# Patient Record
Sex: FEMALE | Race: WHITE | NOT HISPANIC OR LATINO | Employment: OTHER | URBAN - METROPOLITAN AREA
[De-identification: names, ages, dates, MRNs, and addresses within clinical notes are randomized per-mention and may not be internally consistent; named-entity substitution may affect disease eponyms.]

---

## 2017-06-29 ENCOUNTER — TRANSFERRED RECORDS (OUTPATIENT)
Dept: HEALTH INFORMATION MANAGEMENT | Facility: CLINIC | Age: 72
End: 2017-06-29

## 2017-06-30 ENCOUNTER — TRANSFERRED RECORDS (OUTPATIENT)
Dept: HEALTH INFORMATION MANAGEMENT | Facility: CLINIC | Age: 72
End: 2017-06-30

## 2017-07-05 ENCOUNTER — TRANSFERRED RECORDS (OUTPATIENT)
Dept: HEALTH INFORMATION MANAGEMENT | Facility: CLINIC | Age: 72
End: 2017-07-05

## 2017-07-18 ENCOUNTER — TRANSFERRED RECORDS (OUTPATIENT)
Dept: HEALTH INFORMATION MANAGEMENT | Facility: CLINIC | Age: 72
End: 2017-07-18

## 2017-08-17 ENCOUNTER — TRANSFERRED RECORDS (OUTPATIENT)
Dept: HEALTH INFORMATION MANAGEMENT | Facility: CLINIC | Age: 72
End: 2017-08-17

## 2017-08-22 ENCOUNTER — TRANSFERRED RECORDS (OUTPATIENT)
Dept: HEALTH INFORMATION MANAGEMENT | Facility: CLINIC | Age: 72
End: 2017-08-22

## 2017-08-30 ENCOUNTER — TRANSFERRED RECORDS (OUTPATIENT)
Dept: HEALTH INFORMATION MANAGEMENT | Facility: CLINIC | Age: 72
End: 2017-08-30

## 2017-09-06 ENCOUNTER — TRANSFERRED RECORDS (OUTPATIENT)
Dept: HEALTH INFORMATION MANAGEMENT | Facility: CLINIC | Age: 72
End: 2017-09-06

## 2017-09-08 ENCOUNTER — PRE VISIT (OUTPATIENT)
Dept: RADIATION ONCOLOGY | Facility: CLINIC | Age: 72
End: 2017-09-08

## 2017-09-08 NOTE — TELEPHONE ENCOUNTER
1.  Date/reason for appt: 9/14/2107, breast cancer    2.  Referring provider: Dr. Emily Neville    3.  Call to patient (Yes / No - short description): Yes, called patient to schedule consult    4.  Previous care at / records requested from: [facility] (images in pacs viewer and some records faxed with referral - sent request for additional records)

## 2017-09-11 NOTE — TELEPHONE ENCOUNTER
Received additional imaging reports from [facility] - sent to HIM for scanning. Received records from [facility] - sent to HIM for scanning. Requested additional records from [facility] (med onc consult, surgical consult, oncotype/genetic counseling if completed)

## 2017-09-13 ENCOUNTER — TELEPHONE (OUTPATIENT)
Dept: RADIATION ONCOLOGY | Facility: CLINIC | Age: 72
End: 2017-09-13

## 2017-09-13 ENCOUNTER — APPOINTMENT (OUTPATIENT)
Dept: RADIATION ONCOLOGY | Facility: CLINIC | Age: 72
End: 2017-09-13
Payer: MEDICARE

## 2017-09-13 PROCEDURE — 77263 THER RADIOLOGY TX PLNG CPLX: CPT | Performed by: RADIOLOGY

## 2017-09-13 NOTE — TELEPHONE ENCOUNTER
Spoke with patient about appointment time,location, and date with Dr Pino and patient verbalized understanding

## 2017-09-14 ENCOUNTER — APPOINTMENT (OUTPATIENT)
Dept: RADIATION ONCOLOGY | Facility: CLINIC | Age: 72
End: 2017-09-14
Payer: MEDICARE

## 2017-09-14 ENCOUNTER — OFFICE VISIT (OUTPATIENT)
Dept: RADIATION ONCOLOGY | Facility: CLINIC | Age: 72
End: 2017-09-14
Payer: MEDICARE

## 2017-09-14 VITALS
WEIGHT: 133 LBS | TEMPERATURE: 98 F | SYSTOLIC BLOOD PRESSURE: 154 MMHG | HEIGHT: 61 IN | HEART RATE: 57 BPM | DIASTOLIC BLOOD PRESSURE: 84 MMHG | RESPIRATION RATE: 16 BRPM | BODY MASS INDEX: 25.11 KG/M2 | OXYGEN SATURATION: 98 %

## 2017-09-14 DIAGNOSIS — C50.111 MALIGNANT NEOPLASM OF CENTRAL PORTION OF RIGHT BREAST IN FEMALE, ESTROGEN RECEPTOR POSITIVE (H): Primary | ICD-10-CM

## 2017-09-14 DIAGNOSIS — Z17.0 MALIGNANT NEOPLASM OF CENTRAL PORTION OF RIGHT BREAST IN FEMALE, ESTROGEN RECEPTOR POSITIVE (H): Primary | ICD-10-CM

## 2017-09-14 PROCEDURE — 77334 RADIATION TREATMENT AID(S): CPT | Performed by: RADIOLOGY

## 2017-09-14 PROCEDURE — 77290 THER RAD SIMULAJ FIELD CPLX: CPT | Performed by: RADIOLOGY

## 2017-09-14 PROCEDURE — 00000346 ZZHCL STATISTIC REVIEW OUTSIDE SLIDES TC 88321: Performed by: THORACIC SURGERY (CARDIOTHORACIC VASCULAR SURGERY)

## 2017-09-14 PROCEDURE — 99205 OFFICE O/P NEW HI 60 MIN: CPT | Mod: 25 | Performed by: RADIOLOGY

## 2017-09-14 RX ORDER — SIMVASTATIN 40 MG
40 TABLET ORAL
COMMUNITY
Start: 2017-06-27 | End: 2020-10-19

## 2017-09-14 RX ORDER — METOPROLOL SUCCINATE 25 MG/1
25 TABLET, EXTENDED RELEASE ORAL 2 TIMES DAILY
COMMUNITY
Start: 2017-06-27 | End: 2020-10-19

## 2017-09-14 RX ORDER — ACETAMINOPHEN 160 MG
TABLET,DISINTEGRATING ORAL
COMMUNITY
Start: 2011-11-28

## 2017-09-14 RX ORDER — LISINOPRIL 40 MG/1
40 TABLET ORAL
COMMUNITY
Start: 2017-06-27

## 2017-09-14 RX ORDER — HYDROCHLOROTHIAZIDE 50 MG/1
50 TABLET ORAL
COMMUNITY
Start: 2017-06-27

## 2017-09-14 RX ORDER — NITROGLYCERIN 0.4 MG/1
0.4 TABLET SUBLINGUAL
COMMUNITY
Start: 2017-06-27

## 2017-09-14 ASSESSMENT — ENCOUNTER SYMPTOMS
CONSTITUTIONAL NEGATIVE: 1
MYALGIAS: 1
BACK PAIN: 0
PSYCHIATRIC NEGATIVE: 1
NECK PAIN: 0
NEUROLOGICAL NEGATIVE: 1
GASTROINTESTINAL NEGATIVE: 1
FALLS: 0
CARDIOVASCULAR NEGATIVE: 1
EYES NEGATIVE: 1
RESPIRATORY NEGATIVE: 1

## 2017-09-14 ASSESSMENT — PAIN SCALES - GENERAL: PAINLEVEL: NO PAIN (0)

## 2017-09-14 NOTE — PROGRESS NOTES
HPI      Review of Systems   Constitutional: Negative.    HENT: Negative.    Eyes: Negative.    Respiratory: Negative.    Cardiovascular: Negative.    Gastrointestinal: Negative.    Genitourinary: Negative.    Musculoskeletal: Positive for myalgias. Negative for back pain, falls, joint pain and neck pain.        Patient reports having left calf pain/cramps when walking; has been evaluated, but etiology is unknown.   Skin: Negative.    Neurological: Negative.    Endo/Heme/Allergies: Negative.    Psychiatric/Behavioral: Negative.              INITIAL PATIENT ASSESSMENT    Diagnosis: right breast cancer     Prior radiation therapy: None    Prior chemotherapy: None    Prior hormonal therapy:Yes: Three months of HRT following THBSO at age 38    Pain Eval:  Denies    Psychosocial  Living arrangements: Lives at home in [place] with   Fall Risk: independent   referral needs: Not needed    Advanced Directive: No  Implantable Cardiac Device? No    Onset of menarche: 15  LMP: No LMP recorded. Patient is not currently having periods (Reason: Perimenopausal).  Onset of menopause: 38  Abnormal vaginal bleeding/discharge: No  Are you pregnant? No  Reproductive note: Patient reports two pregnancies and two live birth with first birth at age 21. Patient reports approximately 15 years of OCP use.    Nurse face-to-face time: Level 5:  over 15 min face to face time

## 2017-09-14 NOTE — PATIENT INSTRUCTIONS
What to expect at your Simulation visit:    You will meet with a Radiation Therapist and other team members who will be doing a planning session called a  simulation  with you. This process will determine your daily treatment.    ~ You will lie on a flat table and have a treatment planning CT scan.  It is important during the scan to hold very still and breathe normally.    ~ Your therapist may construct a body mold to help you hold still for your treatments.    ~ If you are having treatment to the head or neck area you will be fitted with a plastic mesh mask that fits very snugly over your face and neck.     ~ Your therapist will be taking some digital photos that will go in your treatment chart.      ~Your therapist will make marks on your skin and take measurements. Your therapist may ask you about making small tattoos (a permanent small dot) over these marks.  These marks are used to position you daily for your radiation therapy treatments. Please do not wash off any marks until all of your radiation therapy treatments are complete unless you are instructed to do so by your therapist.    ~ Once the simulation is completed it can take from 3 to 10 business days before you start radiation therapy treatments.    ~ You may meet with a nurse who will go over management of treatment side effects and self care during your treatments. The nurse will help to plan care with other departments and physicians if needed.    Please contact [facility] RN with questions or concerns following today's appointment: [phone].    Thank you!    Lana Graf, RN  RN Care Coordinator, Radiation Therapy  [facility]

## 2017-09-14 NOTE — PROGRESS NOTES
Dear Colleagues,    Today I had the pleasure of seeing this patient in consultation for her recent diagnosis of right (subareolar) breast cancer.     IDENTIFICATION: Ms. Hunter is a 72-year-old postmenopausal woman with a recent diagnosis of right breast bB3A3B4 cancer s/p lumpectomy plus SLNBx revealing wG2R3J5, ER+/CA+/H2N- referred for discussion regarding adjuvant radiation therapy.     HISTORY OF PRESENT ILLNESS: Ms. Hunter is a 71yo woman who was in her usual state of health this past year.   She previously had been receiving MMGs annually. She was seen by her PCP in July of this year who noted right nipple inversion without a discrete mass.  She specifically denies having any new breast masses or other skin changes during this time.      On 7/5/17, diagnostic MMG and ultrasound showed a partially obscured lobulated mass in the central mid right breast. Remainder of the breasts tissue was unremarkable. Ultrasound of the right breast also showed a subtle indistinct lobulated hypoechoic subareolar mass measuring 2.5 cm in greatest dimension correlating to the mammographic findings. No enlarged right axillary lymph nodes were noted.    On 7/18/17, ultrasound guided right breast biopsy of the subareolar region confirmed G1 IDCA w/ extensive mucin production.    She then saw Dr. Erick Villavicencio (Rad Onc) and Dr. Allen Ashby (Surg Onc) in consultation. On 8/22/17, she was taken to the OR by Dr. Ashby for lumpectomy plus SLNBx.  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  No DCIS or LCIS identified.  This gave her a pathologic stage of gN1N8W7, ER+/CA+/H2N-.    She was recently seen by Med Onc on 9/6/17 and no chemo was recommended.  She declined endocrine therapy.     Currently she has no complaints.  She specifically denies any other new masses, skin changes, shortness of breath, chest or bony pain, or new  neurologic symptoms.  She is being seen here today for consideration of postoperative radiotherapy.     REVIEW OF SYSTEMS: As per HPI, a 14-point review of systems is otherwise negative.     PAST RADIATION THERAPY:  Denies.    PAST CTD/PACEMAKER: Denies    Past Medical History:   Diagnosis Date     Coronary atherosclerosis      Diverticulosis of large intestine 2010     Essential hypertension      Internal hemorrhoids      Ischemic heart disease      Malignant neoplasm of right breast (H) 2017     Osteoporosis      Unspecified cataract        Past Surgical History:   Procedure Laterality Date     APPENDECTOMY       CATARACT IOL, RT/LT Right 2014     CATARACT IOL, RT/LT Left 10/08/2014     CHOLECYSTECTOMY       HC BLEPHAROPLASTY UPPER LID W EXCESS SKIN       HYSTERECTOMY  1983     LASER SURGERY OF EYE Bilateral 2017     lumbar laminectomy  2003     percutaneous transluminal coronary angioplasty  2007     TONSILLECTOMY & ADENOIDECTOMY       US BREAST BIOPSY RT Right        Family History   Problem Relation Age of Onset     Leukemia Mother 83     Lung Cancer Mother 45     Lupus Father 68     Breast Cancer Sister 71     Same pathology as patient's, but left breast     Kidney Cancer Sister 55     Brain Cancer Maternal Aunt        Social History   Substance Use Topics     Smoking status: Former Smoker     Packs/day: 1.00     Years: 40.00     Smokeless tobacco: Never Used     Alcohol use No     Lives in [place] but also lives in [place] October through May. Retired .     BREAST RISK FACTORS:  Postmenopausal. Menarche age 15, , 1st child at age 21, OCP x 15years , AMARI  BSO at age 38 for uterine fibroids. Menopause age 38. HRT for 3 months. Sister with mucinous left breast cancer dx 70yo. Very active, exercises regularly.  No family or personal history of ovarian cancer.     PHYSICAL EXAMINATION:  /84 (BP Location: Left arm, Patient Position: Sitting, Cuff Size: Adult  "Regular)  Pulse 57  Temp 98  F (36.7  C) (Oral)  Resp 16  Ht 5' 1\"  Wt 133 lb  SpO2 98%  BMI 25.13 kg/m2  GENERAL                         Well-appearing elderly woman in no acute distress.  HEENT                              Normocephalic, atraumatic.  Sclerae anicteric. Wears glasses.  CARDIOVASCULAR       Regular rate and rhythm.  No murmurs, rubs, or gallops.  LUNGS                              Clear to auscultation bilaterally.  BREASTS                           Breasts are examined in the supine and upright position.  The breasts are asymmetric with the left breast being slightly larger. Within the right upper outer breast/right axilla and upper breast at the 12 oclock position there are two ~3cm well healed incisions.  Firmness of right breast around the incision not suspicious.  There is no other erythema, ulceration or suspicious nodularity within the right breast. The left breast is unremarkable.  There is no erythema, retraction, desquamation or discharge appreciated within the right or left nipple areolar complex.    LYMPH NODES                 No cervical, supraclavicular, infraclavicular, or axillary lymphadenopathy appreciated bilaterally.  ABDOMEN                        Soft.  No hepatosplenomegaly.  Positive bowel sounds.  EXTREMITIES                    No clubbing or cyanosis.    NEUROLOGICAL               Cranial nerves II-XII intact.  5/5 strength in bilateral upper and lower proximal and distal extremities. Sensation intact in all areas tested.  MUSCULOSKELETAL         Good ROM. Slighly restricted with right arm.  No spinal tenderness.  SKIN                                  Warm and well perfused.  Signs of extensive sun exposure.  PSYCHIATRIC                    Alert and oriented x 3     IMPRESSION/PLAN:  Ms. Hunter is a 72-year-old postmenopausal woman with a recent diagnosis of right breast cancer s/p lumpectomy plus SLNBx revealing invasive mucinous carcinoma, xY9H2P3, ER+/IL+/H2N- " referred for discussion regarding adjuvant radiation therapy. No DCIS/LCIS was identified, clear margins. She has a favorable histology (mucinous), which is rare and generally associated with a better prognosis, longer disease-free interval and lower incidence of axillary node metastasis.   I recommend adjuvant XRT to improve local control and overall survival.     Treatment is based on multiple randomized prospective data which show decreased risk of local recurrence by 50% with the addition of XRT to BCS and the EBCTCG metaanalysis published in Lancet 2011 which shows that for every 4 recurrences avoided by year 10 one breast cancer death is avoided by year 15.  We will give her treatment in a hypofractionated regimen which has been shown to be not inferior to standard fractionation long term (Audra CHING et al 2010).      The risks, benefits, treatment rationale and regimen of radiation therapy to the right breast were discussed in great detail today with the patient.  Risks include but are not limited to skin erythema, desquamation, hyper pigmentation, fibrosis, pneumonitis, and secondary malignancy. The patient consented to therapy and had a CT simulation completed in our department today.  XRT will start within the next 1-2 weeks.  Additional problem list to be addressed in the following manner:  1) Systemic Treatment: Postmenopausal Woman, mG3J9J0, ER+/WV+/H2N-.  Med Onc appt was 9/6/17.  No chemo recommended.  Pt currently declining endocrine therapy.  However we discussed the potential benefits of it today and she will revisit the conversation with Med Onc after XRT.    There was ample time for questions and all were answered to the patients satisfaction.  Thank you for allowing me to participate in the care of this patient.  If you have any questions please do not hesitate to contact me at my office.     Sincerely,  Sid Pino MD     Cc:   MD Allen Rob MD Michael Haley, MD Melissa  MD Jamin

## 2017-09-15 ENCOUNTER — TELEPHONE (OUTPATIENT)
Dept: RADIATION ONCOLOGY | Facility: CLINIC | Age: 72
End: 2017-09-15

## 2017-09-20 ENCOUNTER — APPOINTMENT (OUTPATIENT)
Dept: RADIATION ONCOLOGY | Facility: CLINIC | Age: 72
End: 2017-09-20
Payer: MEDICARE

## 2017-09-20 PROCEDURE — 77334 RADIATION TREATMENT AID(S): CPT | Performed by: RADIOLOGY

## 2017-09-20 PROCEDURE — 77307 TELETHX ISODOSE PLAN CPLX: CPT | Performed by: RADIOLOGY

## 2017-09-25 ENCOUNTER — APPOINTMENT (OUTPATIENT)
Dept: RADIATION ONCOLOGY | Facility: CLINIC | Age: 72
End: 2017-09-25
Payer: MEDICARE

## 2017-09-25 PROCEDURE — 77280 THER RAD SIMULAJ FIELD SMPL: CPT | Performed by: RADIOLOGY

## 2017-09-26 ENCOUNTER — APPOINTMENT (OUTPATIENT)
Dept: RADIATION ONCOLOGY | Facility: CLINIC | Age: 72
End: 2017-09-26
Payer: MEDICARE

## 2017-09-26 PROCEDURE — 77412 RADIATION TX DELIVERY LVL 3: CPT | Performed by: RADIOLOGY

## 2017-09-27 ENCOUNTER — APPOINTMENT (OUTPATIENT)
Dept: RADIATION ONCOLOGY | Facility: CLINIC | Age: 72
End: 2017-09-27
Payer: MEDICARE

## 2017-09-27 ENCOUNTER — OFFICE VISIT (OUTPATIENT)
Dept: RADIATION ONCOLOGY | Facility: CLINIC | Age: 72
End: 2017-09-27
Payer: MEDICARE

## 2017-09-27 VITALS — WEIGHT: 133 LBS | BODY MASS INDEX: 25.13 KG/M2

## 2017-09-27 DIAGNOSIS — Z17.0 MALIGNANT NEOPLASM OF CENTRAL PORTION OF RIGHT BREAST IN FEMALE, ESTROGEN RECEPTOR POSITIVE (H): Primary | ICD-10-CM

## 2017-09-27 DIAGNOSIS — C50.111 MALIGNANT NEOPLASM OF CENTRAL PORTION OF RIGHT BREAST IN FEMALE, ESTROGEN RECEPTOR POSITIVE (H): Primary | ICD-10-CM

## 2017-09-27 PROCEDURE — 99207 ZZC DROP WITH A PROCEDURE: CPT | Performed by: RADIOLOGY

## 2017-09-27 PROCEDURE — 77412 RADIATION TX DELIVERY LVL 3: CPT | Performed by: RADIOLOGY

## 2017-09-27 ASSESSMENT — PAIN SCALES - GENERAL: PAINLEVEL: NO PAIN (0)

## 2017-09-27 NOTE — MR AVS SNAPSHOT
After Visit Summary   9/27/2017    Shantelle Hunter    MRN: [id number]           Patient Information     Date Of Birth          1945        Visit Information        Provider Department      9/27/2017 10:00 AM Mai Pino MD [facility]        Today's Diagnoses     Malignant neoplasm of central portion of right breast in female, estrogen receptor positive (H)    -  1      Care Instructions    Please contact [facility] RN with questions or concerns following today's appointment: [phone].    Thank you!    Lana Graf, RN  RN Care Coordinator, Radiation Therapy  [facility]            Follow-ups after your visit        Your next 10 appointments already scheduled     Sep 28, 2017 12:15 PM CDT   TREATMENT with RADIATION THERAPIST   [facility]    [address] [facility] [address]   [phone]            Sep 29, 2017 12:15 PM CDT   TREATMENT with RADIATION THERAPIST   [facility]    [address] [facility] [address]   [phone]            Oct 02, 2017  8:30 AM CDT   TREATMENT with RADIATION THERAPIST   [facility]    [address] [facility] [address]   [phone]            Oct 03, 2017 12:15 PM CDT   TREATMENT with RADIATION THERAPIST   [facility] ([facility]    [address] [facility] [address]   [phone]            Oct 04, 2017  9:30 AM CDT   TREATMENT with RADIATION THERAPIST   [facility] ([facility])    [address] [facility] [address]   [phone]            Oct 04, 2017  9:45 AM CDT   on treatment visit with Mai Pino MD   [facility] ([facility])    [address] [facility] [address]    [phone]            Oct 05, 2017  9:30 AM CDT   TREATMENT with RADIATION THERAPIST   [facility]    [address] [facility] [address]   [phone]            Oct 06, 2017  9:30 AM CDT   TREATMENT with RADIATION THERAPIST   [facility]    [address] [facility] [address]   [phone]            Oct 09, 2017  8:45 AM CDT   TREATMENT with RADIATION THERAPIST   [facility]    [address] [facility] [address] [phone]            Oct 10, 2017  9:30 AM CDT   TREATMENT with RADIATION THERAPIST   [facility] ([facility])    [address] [facility] [address]   [phone]              Who to contact     If you have questions or need follow up information about today's clinic visit or your schedule please contact [facility] directly at [phone].  Normal or non-critical lab and imaging results will be communicated to you by MyChart, letter or phone within 4 business days after the clinic has received the results. If you do not hear from us within 7 days, please contact the clinic through [employer] or phone. If you have a critical or abnormal lab result, we will notify you by phone as soon as possible.  Submit refill requests through [employer] or call your pharmacy and they will forward the refill request to us. Please allow 3 business days for your refill to be completed.          Additional Information About Your Visit        [employer] Information     [employer] is an electronic gateway that provides easy, online access to your medical records. With [employer], you can request a clinic appointment, read your test results, renew a prescription or communicate with your care team.     To sign up for [employer] visit the website at [email or web address]   You will be  asked to enter the access code listed below, as well as some personal information. Please follow the directions to create your username and password.     Your access code is: [email or web address]  Expires: 2017 12:37 PM     Your access code will  in 90 days. If you need help or a new code, please contact your [facility] or call [phone] for assistance.        Care EveryWhere ID     This is your Care EveryWhere ID. This could be used by other organizations to access your [facility] medical records  [id number]        Your Vitals Were     BMI (Body Mass Index)                   25.13 kg/m2            Blood Pressure from Last 3 Encounters:   17 154/84    Weight from Last 3 Encounters:   17 133 lb   17 133 lb              Today, you had the following     No orders found for display       Primary Care Provider Office Phone # Fax #    Shari Ash [phone]       [facility] [address] [place] [address]        Equal Access to Services     [facility]: Hadii aad ku hadasho Soomaali, waaxda luqadaha, qaybta kaalmada adeegyada, waxay jo ann haywilfrid johns . So [facility] [phone].    ATENCIÓN: Si habla español, tiene a dykes disposición servicios gratuitos de asistencia lingüística. Jacob al [phone].    We comply with applicable federal civil rights laws and [place] laws. We do not discriminate on the basis of race, color, national origin, age, disability sex, sexual orientation or gender identity.            Thank you!     Thank you for choosing [facility]  for your care. Our goal is always to provide you with excellent care. Hearing back from our patients is one way we can continue to improve our services. Please take a few minutes to complete the written survey that you may receive in the mail after your visit with us. Thank you!             Your Updated Medication List -  Protect others around you: Learn how to safely use, store and throw away your medicines at www.disposemymeds.org.          This list is accurate as of: 9/27/17 12:37 PM.  Always use your most recent med list.                   Brand Name Dispense Instructions for use Diagnosis    aspirin  MG EC tablet           hydrochlorothiazide 50 MG tablet    HYDRODIURIL     Take 50 mg by mouth        lisinopril 40 MG tablet    PRINIVIL/ZESTRIL     Take 40 mg by mouth        metoprolol 25 MG 24 hr tablet    TOPROL-XL     Take 25 mg by mouth        nitroGLYcerin 0.4 MG sublingual tablet    NITROSTAT     Place 0.4 mg under the tongue        simvastatin 40 MG tablet    ZOCOR     Take 40 mg by mouth        vitamin D3 2000 UNITS Caps

## 2017-09-27 NOTE — PATIENT INSTRUCTIONS
Please contact [facility] RN with questions or concerns following today's appointment: [phone].    Thank you!    Lana Graf, RN  RN Care Coordinator, Radiation Therapy  [facility]

## 2017-09-27 NOTE — PROGRESS NOTES
[facility] PHYSICIANS  SPECIALIZING IN BREAKTHROUGHS  Radiation Oncology    On Treatment Visit Note      Shantelle Hunter      Date: 2017   MRN: [id number]   : 1945  Diagnosis: Right breast cancer      Reason for Visit:  On Radiation Treatment Visit     Treatment Summary to Date  Treatment Site: Right breast Current Dose: 532/4256 cGy Fractions:       Chemotherapy  Chemo concurrent with radx?: No    Subjective:     Doing well with no complaints.  Early in treatment.    Nursing ROS:   Nutrition Alteration  Diet Type: Patient's Preference  Skin  Skin Reaction: 0 - No changes  Skin Note: Aquaphor samples given, and aloe vera gel recommended     Cardiovascular  Respiratory effort: 1 - Normal - without distress     Psychosocial  Pyschosocial Note: Patient reports energy level is good (baseline) at present  Pain Assessment  0-10 Pain Scale: 0      Objective:   Wt 133 lb  BMI 25.13 kg/m2  NAD  A&Ox3  Skin clear no desquamation.  Operative site is CDI.    Labs:  CBC RESULTS: No results for input(s): WBC, RBC, HGB, HCT, MCV, MCH, MCHC, RDW, PLT in the last 66017 hours.  ELECTROLYTES:  No results for input(s): NA, POTASSIUM, CHLORIDE, JEFFREY, CO2, BUN, CR, GLC in the last 21205 hours.    Assessment:    Tolerating radiation therapy well.  All questions and concerns addressed.    Plan:   1. Continue current therapy.    2. Cont skin care.      Mosaiq chart and setup information reviewed  Ports checked        Educational Topic Discussed  Education Instructions: Radiation therapy side effect education completed      Francie Pino MD

## 2017-09-28 ENCOUNTER — APPOINTMENT (OUTPATIENT)
Dept: RADIATION ONCOLOGY | Facility: CLINIC | Age: 72
End: 2017-09-28
Payer: MEDICARE

## 2017-09-28 PROCEDURE — 77412 RADIATION TX DELIVERY LVL 3: CPT | Performed by: RADIOLOGY

## 2017-09-29 ENCOUNTER — APPOINTMENT (OUTPATIENT)
Dept: RADIATION ONCOLOGY | Facility: CLINIC | Age: 72
End: 2017-09-29
Payer: MEDICARE

## 2017-09-29 PROCEDURE — 77412 RADIATION TX DELIVERY LVL 3: CPT | Performed by: RADIOLOGY

## 2017-10-02 ENCOUNTER — APPOINTMENT (OUTPATIENT)
Dept: RADIATION ONCOLOGY | Facility: CLINIC | Age: 72
End: 2017-10-02
Payer: MEDICARE

## 2017-10-02 PROCEDURE — 77427 RADIATION TX MANAGEMENT X5: CPT | Performed by: RADIOLOGY

## 2017-10-02 PROCEDURE — 77412 RADIATION TX DELIVERY LVL 3: CPT | Performed by: RADIOLOGY

## 2017-10-02 PROCEDURE — 77336 RADIATION PHYSICS CONSULT: CPT | Performed by: RADIOLOGY

## 2017-10-02 PROCEDURE — 77417 THER RADIOLOGY PORT IMAGE(S): CPT | Performed by: RADIOLOGY

## 2017-10-03 ENCOUNTER — APPOINTMENT (OUTPATIENT)
Dept: RADIATION ONCOLOGY | Facility: CLINIC | Age: 72
End: 2017-10-03
Payer: MEDICARE

## 2017-10-03 PROCEDURE — 77412 RADIATION TX DELIVERY LVL 3: CPT | Performed by: RADIOLOGY

## 2017-10-04 ENCOUNTER — OFFICE VISIT (OUTPATIENT)
Dept: RADIATION ONCOLOGY | Facility: CLINIC | Age: 72
End: 2017-10-04
Payer: MEDICARE

## 2017-10-04 ENCOUNTER — DOCUMENTATION ONLY (OUTPATIENT)
Dept: SPIRITUAL SERVICES | Facility: CLINIC | Age: 72
End: 2017-10-04

## 2017-10-04 ENCOUNTER — APPOINTMENT (OUTPATIENT)
Dept: RADIATION ONCOLOGY | Facility: CLINIC | Age: 72
End: 2017-10-04
Payer: MEDICARE

## 2017-10-04 VITALS — WEIGHT: 134 LBS | BODY MASS INDEX: 25.32 KG/M2

## 2017-10-04 DIAGNOSIS — Z71.81 SPIRITUAL OR RELIGIOUS COUNSELING: Primary | ICD-10-CM

## 2017-10-04 DIAGNOSIS — C50.111 MALIGNANT NEOPLASM OF CENTRAL PORTION OF RIGHT BREAST IN FEMALE, ESTROGEN RECEPTOR POSITIVE (H): Primary | ICD-10-CM

## 2017-10-04 DIAGNOSIS — Z17.0 MALIGNANT NEOPLASM OF CENTRAL PORTION OF RIGHT BREAST IN FEMALE, ESTROGEN RECEPTOR POSITIVE (H): Primary | ICD-10-CM

## 2017-10-04 PROCEDURE — 99207 ZZC DROP WITH A PROCEDURE: CPT | Performed by: RADIOLOGY

## 2017-10-04 PROCEDURE — 77412 RADIATION TX DELIVERY LVL 3: CPT | Performed by: RADIOLOGY

## 2017-10-04 ASSESSMENT — PAIN SCALES - GENERAL: PAINLEVEL: NO PAIN (0)

## 2017-10-04 NOTE — PROGRESS NOTES
[facility] PHYSICIANS  SPECIALIZING IN BREAKTHROUGHS  Radiation Oncology    On Treatment Visit Note      Shantelle Hunter      Date: 10/4/2017   MRN: [id number]   : 1945  Diagnosis: Right breast cancer      Reason for Visit:  On Radiation Treatment Visit     Treatment Summary to Date  Treatment Site: Right breast Current Dose: 1862/4256 cGy Fractions:       Chemotherapy  Chemo concurrent with radx?: No    Subjective:     Doing well with no complaints.    Nursing ROS:   Nutrition Alteration  Diet Type: Patient's Preference  Skin  Skin Reaction: 0 - No changes  Skin Note: patient reports using 100% aloa      Cardiovascular  Respiratory effort: 1 - Normal - without distress      Psychosocial  Pyschosocial Note: Patient reports energy level is good (baseline) at present  Pain Assessment  0-10 Pain Scale: 0    Objective:   Wt 134 lb  BMI 25.32 kg/m2  A&O x 3  NAD  Minimal erythema within treatment field.  No desquamation.    Labs:  CBC RESULTS: No results for input(s): WBC, RBC, HGB, HCT, MCV, MCH, MCHC, RDW, PLT in the last 93240 hours.  ELECTROLYTES:  No results for input(s): NA, POTASSIUM, CHLORIDE, JEFFREY, CO2, BUN, CR, GLC in the last 54897 hours.    Assessment:    Tolerating radiation therapy well.  All questions and concerns addressed.    Plan:   1. Continue current therapy.    2. Continue skin care.      Mosaiq chart and setup information reviewed  Ports checked    Medication Review  Med list reviewed with patient?: Yes       Francie Pino MD

## 2017-10-04 NOTE — PROGRESS NOTES
"SPIRITUAL HEALTH SERVICES  SPIRITUAL ASSESSMENT Progress Note  [facility]     introduced himself to Shantelle Hunter and informed her of his availability.  She remarked that she was \"covered in that area (spiritually)\".    Chaz Pruett M.Div., [facility]  Staff   Office tel: [phone]    "

## 2017-10-04 NOTE — MR AVS SNAPSHOT
After Visit Summary   10/4/2017    Shantelle Hunter    MRN: [id number]           Patient Information     Date Of Birth          1945        Visit Information        Provider Department      10/4/2017 9:45 AM Mai Pino MD [facility]        Today's Diagnoses     Malignant neoplasm of central portion of right breast in female, estrogen receptor positive (H)    -  1      Care Instructions    Please contact [facility] RN with questions or concerns following today's appointment: [phone].    Thank you!            Follow-ups after your visit        Your next 10 appointments already scheduled     Oct 05, 2017  9:30 AM CDT   TREATMENT with RADIATION THERAPIST   [facility] ([facility])    [address] [facility] [address]   [phone]            Oct 06, 2017  9:30 AM CDT   TREATMENT with RADIATION THERAPIST   [facility] ([facility])    [address] [facility] [address]   [phone]            Oct 09, 2017  8:45 AM CDT   TREATMENT with RADIATION THERAPIST   [facility] ([facility])    [address] [facility] [address]   [phone]            Oct 10, 2017  9:30 AM CDT   TREATMENT with RADIATION THERAPIST   [facility] ([facility])    [address] [facility] [address]   [phone]            Oct 11, 2017  9:30 AM CDT   TREATMENT with RADIATION THERAPIST   [facility] ([facility])    [address] [facility] [address]   [phone]            Oct 11, 2017  9:45 AM CDT   on treatment visit with Mai Pino MD   [facility] ([facility])    [address] [facility] [address]   [phone]            Oct 12, 2017  9:30 AM CDT   TREATMENT with RADIATION THERAPIST   M  [facility] ([facility])    [address] [facility] [address]   [phone]            Oct 13, 2017  9:30 AM CDT   TREATMENT with RADIATION THERAPIST   [facility] ([facility])    [address] [facility] [address]   [phone]            Oct 16, 2017  9:30 AM CDT   TREATMENT with RADIATION THERAPIST   [facility] ([facility])    [address] [facility] [address]   [phone]            Oct 17, 2017  9:30 AM CDT   TREATMENT with RADIATION THERAPIST   [facility] ([facility])    [address] [facility] [address] [phone]              Who to contact     If you have questions or need follow up information about today's clinic visit or your schedule please contact [facility] directly at [phone].  Normal or non-critical lab and imaging results will be communicated to you by MyChart, letter or phone within 4 business days after the clinic has received the results. If you do not hear from us within 7 days, please contact the clinic through Usariumhart or phone. If you have a critical or abnormal lab result, we will notify you by phone as soon as possible.  Submit refill requests through MobileMD or call your pharmacy and they will forward the refill request to us. Please allow 3 business days for your refill to be completed.          Additional Information About Your Visit        MobileMD Information     MobileMD is an electronic gateway that provides easy, online access to your medical records. With MobileMD, you can request a clinic appointment, read your test results, renew a prescription or communicate with your care team.     To sign up for MobileMD visit the website at [email or web address]   You will be asked to enter the access code listed below, as well as some personal information. Please follow  the directions to create your username and password.     Your access code is: [email or web address]  Expires: 2017 12:37 PM     Your access code will  in 90 days. If you need help or a new code, please contact your [facility] or call [phone] for assistance.        Care EveryWhere ID     This is your Care EveryWhere ID. This could be used by other organizations to access your [place] medical records  [id number]        Your Vitals Were     BMI (Body Mass Index)                   25.32 kg/m2            Blood Pressure from Last 3 Encounters:   17 154/84    Weight from Last 3 Encounters:   10/04/17 134 lb   17 133 lb   17 133 lb              Today, you had the following     No orders found for display       Primary Care Provider Office Phone # Fax #    Shari Ash [phone]       [facility] [address] [place] [address]        Equal Access to Services     BRIAN [facility] Center[facility]BER : Hadii aad ku hadasho Soomaali, waaxda luqadaha, qaybta kaalmada adeegyada, waxay idiin hayaan reji johns . So [facility] [phone].    ATENCIÓN: Si habla español, tiene a dykes disposición servicios gratuitos de asistencia lingüística. Llame al [phone].    We comply with applicable federal civil rights laws and [place] laws. We do not discriminate on the basis of race, color, national origin, age, disability, sex, sexual orientation, or gender identity.            Thank you!     Thank you for choosing [facility]  for your care. Our goal is always to provide you with excellent care. Hearing back from our patients is one way we can continue to improve our services. Please take a few minutes to complete the written survey that you may receive in the mail after your visit with us. Thank you!             Your Updated Medication List - Protect others around you: Learn how to safely use, store and throw away your  medicines at www.disposemymeds.org.          This list is accurate as of: 10/4/17  2:05 PM.  Always use your most recent med list.                   Brand Name Dispense Instructions for use Diagnosis    aspirin  MG EC tablet           hydrochlorothiazide 50 MG tablet    HYDRODIURIL     Take 50 mg by mouth        lisinopril 40 MG tablet    PRINIVIL/ZESTRIL     Take 40 mg by mouth        metoprolol 25 MG 24 hr tablet    TOPROL-XL     Take 25 mg by mouth        nitroGLYcerin 0.4 MG sublingual tablet    NITROSTAT     Place 0.4 mg under the tongue        simvastatin 40 MG tablet    ZOCOR     Take 40 mg by mouth        vitamin D3 2000 UNITS Caps

## 2017-10-04 NOTE — PATIENT INSTRUCTIONS
Please contact [facility] RN with questions or concerns following today's appointment: [phone].    Thank you!

## 2017-10-05 ENCOUNTER — APPOINTMENT (OUTPATIENT)
Dept: RADIATION ONCOLOGY | Facility: CLINIC | Age: 72
End: 2017-10-05
Payer: MEDICARE

## 2017-10-05 LAB — COPATH REPORT: NORMAL

## 2017-10-05 PROCEDURE — 77412 RADIATION TX DELIVERY LVL 3: CPT | Performed by: RADIOLOGY

## 2017-10-06 ENCOUNTER — APPOINTMENT (OUTPATIENT)
Dept: RADIATION ONCOLOGY | Facility: CLINIC | Age: 72
End: 2017-10-06
Payer: MEDICARE

## 2017-10-06 PROCEDURE — 77412 RADIATION TX DELIVERY LVL 3: CPT | Performed by: RADIOLOGY

## 2017-10-09 ENCOUNTER — APPOINTMENT (OUTPATIENT)
Dept: RADIATION ONCOLOGY | Facility: CLINIC | Age: 72
End: 2017-10-09
Payer: MEDICARE

## 2017-10-09 PROCEDURE — 77412 RADIATION TX DELIVERY LVL 3: CPT | Performed by: RADIOLOGY

## 2017-10-09 PROCEDURE — 77417 THER RADIOLOGY PORT IMAGE(S): CPT | Performed by: RADIOLOGY

## 2017-10-09 PROCEDURE — 77427 RADIATION TX MANAGEMENT X5: CPT | Performed by: RADIOLOGY

## 2017-10-09 PROCEDURE — 77336 RADIATION PHYSICS CONSULT: CPT | Performed by: RADIOLOGY

## 2017-10-10 ENCOUNTER — APPOINTMENT (OUTPATIENT)
Dept: RADIATION ONCOLOGY | Facility: CLINIC | Age: 72
End: 2017-10-10
Payer: MEDICARE

## 2017-10-10 PROCEDURE — 77412 RADIATION TX DELIVERY LVL 3: CPT | Performed by: RADIOLOGY

## 2017-10-11 ENCOUNTER — APPOINTMENT (OUTPATIENT)
Dept: RADIATION ONCOLOGY | Facility: CLINIC | Age: 72
End: 2017-10-11
Payer: MEDICARE

## 2017-10-11 ENCOUNTER — OFFICE VISIT (OUTPATIENT)
Dept: RADIATION ONCOLOGY | Facility: CLINIC | Age: 72
End: 2017-10-11
Payer: MEDICARE

## 2017-10-11 VITALS — BODY MASS INDEX: 25.11 KG/M2 | WEIGHT: 132.9 LBS

## 2017-10-11 DIAGNOSIS — C50.111 MALIGNANT NEOPLASM OF CENTRAL PORTION OF RIGHT BREAST IN FEMALE, ESTROGEN RECEPTOR POSITIVE (H): Primary | ICD-10-CM

## 2017-10-11 DIAGNOSIS — Z17.0 MALIGNANT NEOPLASM OF CENTRAL PORTION OF RIGHT BREAST IN FEMALE, ESTROGEN RECEPTOR POSITIVE (H): Primary | ICD-10-CM

## 2017-10-11 PROCEDURE — 77412 RADIATION TX DELIVERY LVL 3: CPT | Performed by: RADIOLOGY

## 2017-10-11 PROCEDURE — 99207 ZZC DROP WITH A PROCEDURE: CPT | Performed by: RADIOLOGY

## 2017-10-11 ASSESSMENT — PAIN SCALES - GENERAL: PAINLEVEL: NO PAIN (0)

## 2017-10-11 NOTE — MR AVS SNAPSHOT
After Visit Summary   10/11/2017    Shantelle Hunter    MRN: [id number]           Patient Information     Date Of Birth          1945        Visit Information        Provider Department      10/11/2017 9:15 AM Mai Pino MD [facility]        Today's Diagnoses     Malignant neoplasm of central portion of right breast in female, estrogen receptor positive (H)    -  1      Care Instructions    [facility] RN with questions or concerns following today's appointment: [phone].    Thank you!            Follow-ups after your visit        Your next 10 appointments already scheduled     Oct 12, 2017  9:30 AM CDT   TREATMENT with RADIATION THERAPIST   [facility] ([facility])    [address] [facility] [address]   [phone]            Oct 13, 2017  9:30 AM CDT   TREATMENT with RADIATION THERAPIST   [facility] ([facility])    [address] [facility] [address]   [phone]            Oct 16, 2017  7:45 AM CDT   TREATMENT with RADIATION THERAPIST   [facility] ([facility])    [address] [facility] [address]   [phone]            Oct 17, 2017  9:30 AM CDT   TREATMENT with RADIATION THERAPIST   [facility] ([facility])    [address] [facility] [address]   [phone]            Oct 17, 2017  9:45 AM CDT   on treatment visit with Mai Pino MD   [facility])    [address] [facility] [address]   [phone]            Nov 14, 2017  2:30 PM CST   Return Visit with Mai Pino MD   [facility])    [address] [facility] [address] [phone]              Who to contact     If you have questions or need follow up  information about today's clinic visit or your schedule please contact [facility] directly at [phone].  Normal or non-critical lab and imaging results will be communicated to you by SCS Grouphart, letter or phone within 4 business days after the clinic has received the results. If you do not hear from us within 7 days, please contact the clinic through SCS Grouphart or phone. If you have a critical or abnormal lab result, we will notify you by phone as soon as possible.  Submit refill requests through LocusLabs or call your pharmacy and they will forward the refill request to us. Please allow 3 business days for your refill to be completed.          Additional Information About Your Visit        SCS GroupharEtelos Information     LocusLabs is an electronic gateway that provides easy, online access to your medical records. With LocusLabs, you can request a clinic appointment, read your test results, renew a prescription or communicate with your care team.     To sign up for LocusLabs visit the website at [email or web address]   You will be asked to enter the access code listed below, as well as some personal information. Please follow the directions to create your username and password.     Your access code is: [email or web address]  Expires: 2017 12:37 PM     Your access code will  in 90 days. If you need help or a new code, please contact your [facility] or call [phone] for assistance.        Care EveryWhere ID     This is your Care EveryWhere ID. This could be used by other organizations to access your [place] medical records  [id number]        Your Vitals Were     BMI (Body Mass Index)                   25.11 kg/m2            Blood Pressure from Last 3 Encounters:   17 154/84    Weight from Last 3 Encounters:   10/11/17 132 lb 14.4 oz   10/04/17 134 lb   17 133 lb              Today, you had the following     No orders found for display       Primary Care  Provider Office Phone # Fax #    Shari Ash [phone]       [facility] [address] [place] [address]        Equal Access to Services     JEFRY ROBLES : Haddebi max lindsay kaylano Soomaali, waaxda luqadaha, qaybta kaalmada adeegyada, petrona mascorron reji goldman laMarywilfrid basurto. So [facility] [phone].    ATENCIÓN: Si habla español, tiene a dykes disposición servicios gratuitos de asistencia lingüística. Llame al [phone].    We comply with applicable federal civil rights laws and [place] laws. We do not discriminate on the basis of race, color, national origin, age, disability, sex, sexual orientation, or gender identity.            Thank you!     Thank you for choosing [facility]  for your care. Our goal is always to provide you with excellent care. Hearing back from our patients is one way we can continue to improve our services. Please take a few minutes to complete the written survey that you may receive in the mail after your visit with us. Thank you!             Your Updated Medication List - Protect others around you: Learn how to safely use, store and throw away your medicines at www.disposemymeds.org.          This list is accurate as of: 10/11/17 11:34 AM.  Always use your most recent med list.                   Brand Name Dispense Instructions for use Diagnosis    aspirin  MG EC tablet           hydrochlorothiazide 50 MG tablet    HYDRODIURIL     Take 50 mg by mouth        lisinopril 40 MG tablet    PRINIVIL/ZESTRIL     Take 40 mg by mouth        metoprolol 25 MG 24 hr tablet    TOPROL-XL     Take 25 mg by mouth        nitroGLYcerin 0.4 MG sublingual tablet    NITROSTAT     Place 0.4 mg under the tongue        simvastatin 40 MG tablet    ZOCOR     Take 40 mg by mouth        vitamin D3 2000 UNITS Caps

## 2017-10-11 NOTE — PROGRESS NOTES
[facility] PHYSICIANS  SPECIALIZING IN BREAKTHROUGHS  Radiation Oncology    On Treatment Visit Note      Shantelle Hunter      Date: 10/11/2017   MRN: [id number]   : 1945  Diagnosis: Right breast cancer      Reason for Visit:  On Radiation Treatment Visit     Treatment Summary to Date  Treatment Site: Right breast Current Dose: 3192/4256 cGy Fractions:       Chemotherapy  Chemo concurrent with radx?: No    Subjective:     Doing well with no complaints.    Nursing ROS:   Nutrition Alteration  Diet Type: Patient's Preference  Skin  Skin Reaction: 0 - No changes  Skin Note: patient reports using aquaphor  Cardiovascular  Respiratory effort: 1 - Normal - without distress   Psychosocial  Pyschosocial Note: Patient reports energy level is good (baseline) at present  Pain Assessment  0-10 Pain Scale: 0      Objective:   Wt 132 lb 14.4 oz  BMI 25.11 kg/m2  A&O x 3  NAD  Minimal erythema within treatment field.  No desquamation    Labs:  CBC RESULTS: No results for input(s): WBC, RBC, HGB, HCT, MCV, MCH, MCHC, RDW, PLT in the last 74169 hours.  ELECTROLYTES:  No results for input(s): NA, POTASSIUM, CHLORIDE, JEFFREY, CO2, BUN, CR, GLC in the last 12988 hours.    Assessment:    Tolerating radiation therapy well.  All questions and concerns addressed.    Plan:   1. Continue current therapy.    2. Continue skin care.      Mosaiq chart and setup information reviewed  Ports checked    Medication Review  Med list reviewed with patient?: Yes           Sid Pino MD

## 2017-10-12 ENCOUNTER — APPOINTMENT (OUTPATIENT)
Dept: RADIATION ONCOLOGY | Facility: CLINIC | Age: 72
End: 2017-10-12
Payer: MEDICARE

## 2017-10-12 PROCEDURE — 77412 RADIATION TX DELIVERY LVL 3: CPT | Performed by: RADIOLOGY

## 2017-10-13 ENCOUNTER — APPOINTMENT (OUTPATIENT)
Dept: RADIATION ONCOLOGY | Facility: CLINIC | Age: 72
End: 2017-10-13
Payer: MEDICARE

## 2017-10-13 PROCEDURE — 77412 RADIATION TX DELIVERY LVL 3: CPT | Performed by: RADIOLOGY

## 2017-10-16 ENCOUNTER — APPOINTMENT (OUTPATIENT)
Dept: RADIATION ONCOLOGY | Facility: CLINIC | Age: 72
End: 2017-10-16
Payer: MEDICARE

## 2017-10-16 PROCEDURE — 77427 RADIATION TX MANAGEMENT X5: CPT | Performed by: RADIOLOGY

## 2017-10-16 PROCEDURE — 77412 RADIATION TX DELIVERY LVL 3: CPT | Performed by: RADIOLOGY

## 2017-10-16 PROCEDURE — 77336 RADIATION PHYSICS CONSULT: CPT | Performed by: RADIOLOGY

## 2017-10-17 ENCOUNTER — OFFICE VISIT (OUTPATIENT)
Dept: RADIATION ONCOLOGY | Facility: CLINIC | Age: 72
End: 2017-10-17
Payer: MEDICARE

## 2017-10-17 ENCOUNTER — APPOINTMENT (OUTPATIENT)
Dept: RADIATION ONCOLOGY | Facility: CLINIC | Age: 72
End: 2017-10-17
Payer: MEDICARE

## 2017-10-17 VITALS — WEIGHT: 134 LBS | BODY MASS INDEX: 25.32 KG/M2

## 2017-10-17 DIAGNOSIS — Z17.0 MALIGNANT NEOPLASM OF CENTRAL PORTION OF RIGHT BREAST IN FEMALE, ESTROGEN RECEPTOR POSITIVE (H): Primary | ICD-10-CM

## 2017-10-17 DIAGNOSIS — C50.111 MALIGNANT NEOPLASM OF CENTRAL PORTION OF RIGHT BREAST IN FEMALE, ESTROGEN RECEPTOR POSITIVE (H): Primary | ICD-10-CM

## 2017-10-17 PROCEDURE — 99207 ZZC DROP WITH A PROCEDURE: CPT | Performed by: RADIOLOGY

## 2017-10-17 PROCEDURE — 77412 RADIATION TX DELIVERY LVL 3: CPT | Performed by: RADIOLOGY

## 2017-10-17 ASSESSMENT — PAIN SCALES - GENERAL: PAINLEVEL: NO PAIN (0)

## 2017-10-17 NOTE — PATIENT INSTRUCTIONS
Follow up with Dr Pino on 11/14/17 at 230 pm    Please contact [facility] RN with questions or concerns following today's appointment: [phone].    Thank you!

## 2017-10-17 NOTE — MR AVS SNAPSHOT
After Visit Summary   10/17/2017    Shantelle Hunter    MRN: [id number]           Patient Information     Date Of Birth          1945        Visit Information        Provider Department      10/17/2017 9:45 AM Mai Pino MD [facility]        Today's Diagnoses     Malignant neoplasm of central portion of right breast in female, estrogen receptor positive (H)    -  1      Care Instructions    Follow up with Dr Pino on 11/14/17 at 230 pm    Please contact [facility] RN with questions or concerns following today's appointment: [phone].    Thank you!            Follow-ups after your visit        Your next 10 appointments already scheduled     Nov 14, 2017  2:30 PM CST   Return Visit with Mai Pino MD   [facility] ([facility])    [address] [place] [address] [phone]              Who to contact     If you have questions or need follow up information about today's clinic visit or your schedule please contact [facility] directly at [phone].  Normal or non-critical lab and imaging results will be communicated to you by Neurolinkhart, letter or phone within 4 business days after the clinic has received the results. If you do not hear from us within 7 days, please contact the clinic through Neurolinkhart or phone. If you have a critical or abnormal lab result, we will notify you by phone as soon as possible.  Submit refill requests through [employer] or call your pharmacy and they will forward the refill request to us. Please allow 3 business days for your refill to be completed.          Additional Information About Your Visit        Neurolinkhart Information     [employer] is an electronic gateway that provides easy, online access to your medical records. With [employer], you can request a clinic appointment, read your test results, renew a prescription or communicate with your care team.     To  sign up for Prixelt visit the website at [email or web address]   You will be asked to enter the access code listed below, as well as some personal information. Please follow the directions to create your username and password.     Your access code is: [email or web address]  Expires: 2017 12:37 PM     Your access code will  in 90 days. If you need help or a new code, please contact your [facility] or call [phone] for assistance.        Care EveryWhere ID     This is your Care EveryWhere ID. This could be used by other organizations to access your [place] medical records  [id number]        Your Vitals Were     BMI (Body Mass Index)                   25.32 kg/m2            Blood Pressure from Last 3 Encounters:   17 154/84    Weight from Last 3 Encounters:   10/17/17 134 lb   10/11/17 132 lb 14.4 oz   10/04/17 134 lb              Today, you had the following     No orders found for display       Primary Care Provider Office Phone # Fax #    Shari Ash [phone]       [facility] [address] [place] [address]        Equal Access to Services     JEFRY ROBLES : Hadii aad ku hadasho Soomaali, waaxda luqadaha, qaybta kaalmada adeegyada, waxay idiin hayaan adeeg jones lacam ah. So [facility] [phone].    ATENCIÓN: Si habla español, tiene a dykes disposición servicios gratuitos de asistencia lingüística. Llame al [phone].    We comply with applicable federal civil rights laws and [place] laws. We do not discriminate on the basis of race, color, national origin, age, disability, sex, sexual orientation, or gender identity.            Thank you!     Thank you for choosing [facility]  for your care. Our goal is always to provide you with excellent care. Hearing back from our patients is one way we can continue to improve our services. Please take a few minutes to complete the written survey  that you may receive in the mail after your visit with us. Thank you!             Your Updated Medication List - Protect others around you: Learn how to safely use, store and throw away your medicines at www.disposemymeds.org.          This list is accurate as of: 10/17/17 11:59 PM.  Always use your most recent med list.                   Brand Name Dispense Instructions for use Diagnosis    aspirin  MG EC tablet           hydrochlorothiazide 50 MG tablet    HYDRODIURIL     Take 50 mg by mouth        lisinopril 40 MG tablet    PRINIVIL/ZESTRIL     Take 40 mg by mouth        metoprolol 25 MG 24 hr tablet    TOPROL-XL     Take 25 mg by mouth        nitroGLYcerin 0.4 MG sublingual tablet    NITROSTAT     Place 0.4 mg under the tongue        simvastatin 40 MG tablet    ZOCOR     Take 40 mg by mouth        vitamin D3 2000 UNITS Caps

## 2017-10-18 NOTE — PROGRESS NOTES
[facility] PHYSICIANS  SPECIALIZING IN BREAKTHROUGHS  Radiation Oncology    On Treatment Visit Note      Shantelle Hunter      Date: 10/18/2017   MRN: [id number]   : 1945  Diagnosis: Right breast cancer      Reason for Visit:  On Radiation Treatment Visit     Treatment Summary to Date  Treatment Site: Right breast Current Dose: 4256/4256 cGy Fractions: 16      Chemotherapy  Chemo concurrent with radx?: No    Subjective:     Pt doing well with only minimal erythema within treatment field.  No complaints.  Denies fatigue or headache.    Nursing ROS:   Nutrition Alteration  Diet Type: Patient's Preference  Skin  Skin Reaction: 0 - No changes  Skin Note: patient reports using aquaphor  Cardiovascular  Respiratory effort: 1 - Normal - without distress  Psychosocial  Pyschosocial Note: Patient reports energy level is good (baseline) at present  Pain Assessment  0-10 Pain Scale: 0      Objective:   Wt 134 lb  BMI 25.32 kg/m2  NAD  A&O X 3  Minimal erythema within treatment field, no desquamation.    Labs:  CBC RESULTS: No results for input(s): WBC, RBC, HGB, HCT, MCV, MCH, MCHC, RDW, PLT in the last 84770 hours.  ELECTROLYTES:  No results for input(s): NA, POTASSIUM, CHLORIDE, JEFFREY, CO2, BUN, CR, GLC in the last 20980 hours.    Assessment:    Tolerating radiation therapy well.  All questions and concerns addressed.  Last day of treatment today.  Discussed considering endocrine therapy if offered by Med Onc, pt will reconsider and go to scheduled appt.    Plan:   1. Continue current therapy.   2. Continue skin care.  3. Follow up with me in 1 month.  4. Follow up with Med Onc as previously directed.      Mosaiq chart and setup information reviewed  Ports checked    Medication Review  Med list reviewed with patient?: Yes    Educational Topic Discussed  Education Instructions: follow up with Dr Pino on 17      Sid Pino MD

## 2017-10-25 ENCOUNTER — TELEPHONE (OUTPATIENT)
Dept: RADIATION ONCOLOGY | Facility: CLINIC | Age: 72
End: 2017-10-25

## 2017-10-25 ENCOUNTER — TRANSFERRED RECORDS (OUTPATIENT)
Dept: HEALTH INFORMATION MANAGEMENT | Facility: CLINIC | Age: 72
End: 2017-10-25

## 2017-10-25 NOTE — TELEPHONE ENCOUNTER
Patient contact clinic to cancel visit stating she was leaving for [place] on 11/1/17 and will not make 11/14/17 visit with Dr Pino. Patient stated she will follow up in 6/2018 once back in home. Spoke with Dr Pino and she stated patient to be seen in [place]. Informed patient of advice and patient stated she will not be seeing anyone while in [place]. Patient said she saw Dr Neville for medical oncologist and was told to follow up with PCP twice per year for check ups. Told patient this writer will obtain note from Dr Neville visit. Informed patient that this writer will set reminder to contact patient 6/2018 for follow up

## 2019-03-02 NOTE — PROCEDURES
Radiotherapy Treatment Summary          Date of Report: 2017     PATIENT: BECCA HUNTER  MEDICAL RECORD NO: [id number]  : 1945     DIAGNOSIS: C50.011 Malignant neoplasm of nipple and areola, right female breast  INTENT OF RADIOTHERAPY: Cure  PATHOLOGY/STAGE:  Ms. Hunter is a 72-year-old postmenopausal woman with a diagnosis of right breast cancer s/p   lumpectomy plus SLNBx revealing invasive mucinous carcinoma, mD2C1O9, ER+/ND+/H2N-. No DCIS/LCIS was   identified and she had clear margins.  She completed an adjuvant course of radiation therapy. Below is her treatment   summary.     Details of the treatments summarized below are found in records kept in the Department of Radiation Oncology at   [facility].     Treatment Summary:  Radiation Oncology - Course: 1 Protocol:   Treatment Site Current Dose Modality From To Elapsed Days Fx.  1 R breast  4,256 cGy 6X/10X  9/26/2017 10/17/2017  21 16          Dose per Fraction:       Total Dose:        266cGy    4256cGy to the whole breast        COMMENTS:                      (Acute Toxicity Profile by CTC v5.0)  Patient did well with only minimal/subtle skin erythema within treatment field.  No other complaints reported.  Denied   fatigue or headache.     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    Follow up with Rad Onc prn.     Staff Physician: Sid Pino M.D.  Physicist: Pattie Sawyer MS     CC: Emily Neville MD              Radiation Oncology [address] [place] [address] Phone: [phone]

## 2020-10-11 NOTE — PROGRESS NOTES
Dear Colleagues,  Today Shantelle Hunter was seen in consultation.  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 She will not be receiving chemotherapy or continue onto adjuvant hormonal therapy.  HISTORY OF PRESENT ILLNESS: Shantelle Hunter is well known to our clinic.  She was previously diagnosed with right breast cancer s/p lumpectomy plus SLNBx revealing invasive mucinous carcinoma, [id number], ER+/HI+/H2N-. No DCIS/LCIS was identified and she had clear margins.  She completed an adjuvant course of radiation therapy on 10/17/2020. Afterwards Ms. Hunter elected not to proceed with endocrine therapy given her age and issues with hormone therapy in the past following hysterectomy.  She desired surveillance through primary care rather than oncology clinic.  In August 28, 2020, bilateral screening MMG showed left breast asymmetry at the 3 oclock position and the remainder of the breast tissue was unremarkable. This was confirmed by diagnostic MMG on 8/31/2020 and no enlarged left axillary lymph nodes were noted. On 9/8/2020 US guided biopsy of the left breast lesion was c/c G1 invasive mucinous carcioma with Ca++, ER+/HI+/Her2-.  On September 30, 2020, patient was taken to the OR for a ultrasound-guided radioactive seed localized lumpectomy and sentinel lymph node biopsy.  Pathology revealed 0.6 cm of grade 1 mucinous carcinoma with negative margins.  There were 0 out of 2 lymph nodes removed from the left axilla.  There was no specimen radiograph completed.  Her postoperative course has been unenventful.  She was recently seen by Dr. Emily Neville.  The benefit of treatment did not outweigh the risks and no systemic chemotherapy is recommended. Given the impact of adjuvant hormonal therapy on her quality of life she will not be having continued surveillance with medical oncology after completing radiation therapy.  She plans to move to [place] for at least this winter starting in 2020.   Since her surgery, she has continued to heal well and denies any significant pain.  She has good ROM.  She denies any other new masses, skin changes, shortness of breath, chest or bony pain, or new neurologic symptoms. She is being seen here today for consideration of postoperative radiotherapy.  REVIEW OF SYSTEMS: As per HPI, a 14-point review of system is otherwise negative.  PAST RADIATION THERAPY: Per HPI patient has had prior right breast XRT. She received 4256cGy to the whole right breast from 17 to 10/17/17  PAST CTD/PACEMAKER: Denies  BREAST RISK FACTORS: No family history of ovarian cancer. Her sister was diagnosed with breast cancer at age 71. She started her menses at age 17.   with her first delivery at age 21. She  for a total of 6 months.  Hysterectomy. No history of HRT. OCP 5-10 years.    Past Medical History:   Diagnosis Date     Breast cancer (H) 2020    Left Breast - Invasive Mucinous Carcinoma     Coronary atherosclerosis      Diverticulosis of large intestine 2010     Essential hypertension      Internal hemorrhoids      Ischemic heart disease      Malignant neoplasm of right breast (H) 2017     Osteoporosis      S/P radiation therapy     4,256 cGy to right breast completed on 10/17/2017 - [facility]     Unspecified cataract        Past Surgical History:   Procedure Laterality Date     APPENDECTOMY       BREAST BIOPSY, CORE RT/LT Right 2017     BREAST BIOPSY, CORE RT/LT Left 2020     BREAST LUMPECTOMY, RT/LT Right 2017     CATARACT IOL, RT/LT Right 2014     CATARACT IOL,  RT/LT Left 10/08/2014     CHOLECYSTECTOMY       COLONOSCOPY      patient reports she is due for next colonoscopy in      HC BLEPHAROPLASTY UPPER LID W EXCESS SKIN       HYSTERECTOMY       LASER SURGERY OF EYE Bilateral 2017     lumbar laminectomy  2003     LUMPECTOMY BREAST WITH SENTINEL NODE, COMBINED Left 2020     percutaneous transluminal coronary angioplasty  2007     TONSILLECTOMY & ADENOIDECTOMY         Family History   Problem Relation Age of Onset     Leukemia Mother 83     Lupus Father 68     Breast Cancer Sister 71        Same pathology as patient's, but left breast     Kidney Cancer Sister 55     Brain Cancer Maternal Aunt      Ovarian Cancer No family hx of        Social History     Tobacco Use     Smoking status: Former Smoker     Packs/day: 1.00     Years: 40.00     Pack years: 40.00     Quit date: 2006     Years since quittin.8     Smokeless tobacco: Never Used   Substance Use Topics     Alcohol use: No     Current Outpatient Medications   Medication     Cholecalciferol (VITAMIN D3) 2000 UNITS CAPS     hydrochlorothiazide (HYDRODIURIL) 50 MG tablet     lisinopril (PRINIVIL/ZESTRIL) 40 MG tablet     MAGNESIUM PO     metoprolol (TOPROL-XL) 25 MG 24 hr tablet     nitroGLYcerin (NITROSTAT) 0.4 MG sublingual tablet     No current facility-administered medications for this visit.       No Known Allergies  PHYSICAL EXAMINATION:  BP (!) 150/87 (BP Location: Right arm, Patient Position: Chair, Cuff Size: Adult Regular)   Pulse 57   Temp 97.4  F (36.3  C) (Oral)   Resp 18   Wt 64.4 kg (142 lb)   SpO2 99%   BMI 26.83 kg/m    GENERAL Well-appearing woman in no acute distress.  HEENT Normocephalic, atraumatic.  Sclerae anicteric.  CVR  Regular rate and rhythm.  No murmurs, rubs, or gallops.  LUNGS Clear to auscultation bilaterally.  BREASTS Breasts are examined in the supine and upright position.  The breasts are symmetric.  Both breast show no suspicious erythema,  ulceration or nodularity within them. Left breast and axillary scars are well healed. There is no erythema, retraction, desquamation or discharge appreciated within the right or left nipple areolar complex.    LYMPH No supraclavicular, infraclavicular, or axillary lymphadenopathy appreciated bilaterally.  ABDOMEN Soft.    EXT  No clubbing or cyanosis or edema.    NEURO No focal deficits.  MSK  Good ROM.   SKIN  Warm and well perfused.    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 She will not be receiving chemotherapy or continue onto adjuvant hormonal therapy.  I recommend adjuvant XRT to improve local control and overall survival.  Plan is to treat the affected breast based on multiple randomized prospective data which show decrease risk of local recurrence by ~50% with the addition of XRT to BCS and the EBCTCG metaanalysis published in Lancet 2011 which shows that for every 4 recurrences avoided by year 10 one breast cancer death is avoided by year 15.    The risks, benefits, treatment rationale and regimen of radiation therapy to the breast were discussed in great detail today with the patient.  Risks include but are not limited to skin erythema, desquamation, hyper pigmentation, breast and lymphedema, fibrosis, telengectasia, pneumonitis, cardiac toxicity, rib fracture and secondary malignancy. The patient consented to therapy and is scheduled for a CT simulation. Treatment will start within 1 week.    Additional problem list to be addressed in the following  manner:  1. Hormonal Treatment in Postmenopausal Woman: Pt declining chemo and adjuvant hormones.  Given the impact of adjuvant hormonal therapy on her quality of life she will not be having continued surveillance with medical oncology after completing radiation therapy.  She plans to move to [place] for at least this winter starting in November 2020. Pt to continue follow up with PCP as previously directed.  Future mmg/scans per PCP.  There was ample time for questions and all were answered to the patient's satisfaction. Thank you for allowing me to participate in the care of this pleasant patient. If you have any questions, please do not hesitate to contact my office.    Sincerely,  Sid Pino MD

## 2020-10-19 ENCOUNTER — APPOINTMENT (OUTPATIENT)
Dept: RADIATION ONCOLOGY | Facility: CLINIC | Age: 75
End: 2020-10-19
Payer: MEDICARE

## 2020-10-19 ENCOUNTER — OFFICE VISIT (OUTPATIENT)
Dept: RADIATION ONCOLOGY | Facility: CLINIC | Age: 75
End: 2020-10-19
Payer: MEDICARE

## 2020-10-19 VITALS
OXYGEN SATURATION: 99 % | WEIGHT: 142 LBS | DIASTOLIC BLOOD PRESSURE: 87 MMHG | BODY MASS INDEX: 26.83 KG/M2 | RESPIRATION RATE: 18 BRPM | SYSTOLIC BLOOD PRESSURE: 150 MMHG | TEMPERATURE: 97.4 F | HEART RATE: 57 BPM

## 2020-10-19 DIAGNOSIS — C50.412 MALIGNANT NEOPLASM OF UPPER-OUTER QUADRANT OF LEFT BREAST IN FEMALE, ESTROGEN RECEPTOR POSITIVE (H): Primary | ICD-10-CM

## 2020-10-19 DIAGNOSIS — Z17.0 MALIGNANT NEOPLASM OF UPPER-OUTER QUADRANT OF LEFT BREAST IN FEMALE, ESTROGEN RECEPTOR POSITIVE (H): Primary | ICD-10-CM

## 2020-10-19 PROCEDURE — 99205 OFFICE O/P NEW HI 60 MIN: CPT | Performed by: RADIOLOGY

## 2020-10-19 PROCEDURE — 77263 THER RADIOLOGY TX PLNG CPLX: CPT | Performed by: RADIOLOGY

## 2020-10-19 PROCEDURE — 77290 THER RAD SIMULAJ FIELD CPLX: CPT | Performed by: RADIOLOGY

## 2020-10-19 ASSESSMENT — PAIN SCALES - GENERAL: PAINLEVEL: NO PAIN (0)

## 2020-10-19 NOTE — PATIENT INSTRUCTIONS
What to expect at your Simulation visit:    You will meet with a Radiation Therapist and other team members who will be doing a planning session called a  simulation  with you. This process will determine your daily treatment.    ~ You will lie on a flat table and have a treatment planning CT scan.  It is important during the scan to hold very still and breathe normally.    ~ Your therapist may construct a body mold to help you hold still for your treatments.    ~ If you are having treatment to the head or neck area you will be fitted with a plastic mesh mask that fits very snugly over your face and neck.     ~ Your therapist will be taking some digital photos that will go in your treatment chart.      ~Your therapist will make marks on your skin and take measurements. Your therapist may ask you about making small tattoos (a permanent small dot) over these marks.  These marks are used to position you daily for your radiation therapy treatments. Please do not wash off any marks until all of your radiation therapy treatments are complete unless you are instructed to do so by your therapist.    ~ Once the simulation is completed it can take from 3 to 10 business days before you start radiation therapy treatments.    ~ You may meet with a nurse who will go over management of treatment side effects and self care during your treatments. The nurse will help to plan care with other departments and physicians if needed.      Please contact [facility] RN with questions or concerns following today's appointment: [phone].    Thank you!

## 2020-10-19 NOTE — LETTER
10/19/2020         RE: Shantelle Hunter  [address]  [place] [address]        Dear Colleague,    Thank you for referring your patient, Shantelle Hunter, to the [facility] [place]. Please see a copy of my visit note below.    Dear Colleagues,  Today Shantelle Hunter was seen in consultation.  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 She will not be receiving chemotherapy or continue onto adjuvant hormonal therapy.  HISTORY OF PRESENT ILLNESS: Shantelle Hunter is well known to our clinic.  She was previously diagnosed with right breast cancer s/p lumpectomy plus SLNBx revealing invasive mucinous carcinoma, [id number], ER+/OH+/H2N-. No DCIS/LCIS was identified and she had clear margins.  She completed an adjuvant course of radiation therapy on 10/17/2020. Afterwards Ms. Hunter elected not to proceed with endocrine therapy given her age and issues with hormone therapy in the past following hysterectomy.  She desired surveillance through primary care rather than oncology clinic.  In August 28, 2020, bilateral screening MMG showed left breast asymmetry at the 3 oclock position and the remainder of the breast tissue was unremarkable. This was confirmed by diagnostic MMG on 8/31/2020 and no enlarged left axillary lymph nodes were noted. On 9/8/2020 US guided biopsy of the left breast lesion was c/c G1 invasive mucinous carcioma with Ca++, ER+/OH+/Her2-.  On September 30, 2020, patient was taken to the OR for a ultrasound-guided radioactive seed localized lumpectomy and sentinel lymph node biopsy.  Pathology revealed  0.6 cm of grade 1 mucinous carcinoma with negative margins.  There were 0 out of 2 lymph nodes removed from the left axilla.  There was no specimen radiograph completed.  Her postoperative course has been unenventful.  She was recently seen by Dr. Emily Neville. The benefit of treatment did not outweigh the risks and no systemic chemotherapy is recommended. Given the impact of adjuvant hormonal therapy on her quality of life she will not be having continued surveillance with medical oncology after completing radiation therapy.  She plans to move to [place] for at least this winter starting in 2020.   Since her surgery, she has continued to heal well and denies any significant pain.  She has good ROM.  She denies any other new masses, skin changes, shortness of breath, chest or bony pain, or new neurologic symptoms. She is being seen here today for consideration of postoperative radiotherapy.  REVIEW OF SYSTEMS: As per HPI, a 14-point review of system is otherwise negative.  PAST RADIATION THERAPY: Per HPI patient has had prior right breast XRT. She received 4256cGy to the whole right breast from 17 to 10/17/17  PAST CTD/PACEMAKER: Denies  BREAST RISK FACTORS: No family history of ovarian cancer. Her sister was diagnosed with breast cancer at age 71. She started her menses at age 17.   with her first delivery at age 21. She  for a total of 6 months.  Hysterectomy. No history of HRT. OCP 5-10 years.    Past Medical History:   Diagnosis Date     Breast cancer (H) 2020    Left Breast - Invasive Mucinous Carcinoma     Coronary atherosclerosis      Diverticulosis of large intestine 2010     Essential hypertension      Internal hemorrhoids      Ischemic heart disease      Malignant neoplasm of right breast (H) 2017     Osteoporosis      S/P radiation therapy     4,256 cGy to right breast completed on 10/17/2017 - [facility]     Unspecified cataract         Past Surgical History:   Procedure Laterality Date     APPENDECTOMY       BREAST BIOPSY, CORE RT/LT Right 2017     BREAST BIOPSY, CORE RT/LT Left 2020     BREAST LUMPECTOMY, RT/LT Right 2017     CATARACT IOL, RT/LT Right 2014     CATARACT IOL, RT/LT Left 10/08/2014     CHOLECYSTECTOMY       COLONOSCOPY      patient reports she is due for next colonoscopy in      HC BLEPHAROPLASTY UPPER LID W EXCESS SKIN       HYSTERECTOMY       LASER SURGERY OF EYE Bilateral 2017     lumbar laminectomy  2003     LUMPECTOMY BREAST WITH SENTINEL NODE, COMBINED Left 2020     percutaneous transluminal coronary angioplasty  2007     TONSILLECTOMY & ADENOIDECTOMY         Family History   Problem Relation Age of Onset     Leukemia Mother 83     Lupus Father 68     Breast Cancer Sister 71        Same pathology as patient's, but left breast     Kidney Cancer Sister 55     Brain Cancer Maternal Aunt      Ovarian Cancer No family hx of        Social History     Tobacco Use     Smoking status: Former Smoker     Packs/day: 1.00     Years: 40.00     Pack years: 40.00     Quit date: 2006     Years since quittin.8     Smokeless tobacco: Never Used   Substance Use Topics     Alcohol use: No     Current Outpatient Medications   Medication     Cholecalciferol (VITAMIN D3) 2000 UNITS CAPS     hydrochlorothiazide (HYDRODIURIL) 50 MG tablet     lisinopril (PRINIVIL/ZESTRIL) 40 MG tablet     MAGNESIUM PO     metoprolol (TOPROL-XL) 25 MG 24 hr tablet     nitroGLYcerin (NITROSTAT) 0.4 MG sublingual tablet     No current facility-administered medications for this visit.       No Known Allergies  PHYSICAL EXAMINATION:  BP (!) 150/87 (BP Location: Right arm, Patient Position: Chair, Cuff Size: Adult Regular)   Pulse 57   Temp 97.4  F (36.3  C) (Oral)   Resp 18   Wt 64.4 kg (142 lb)   SpO2 99%   BMI 26.83 kg/m    GENERAL Well-appearing woman in no acute distress.  HEENT Normocephalic,  atraumatic.  Sclerae anicteric.  CVR  Regular rate and rhythm.  No murmurs, rubs, or gallops.  LUNGS Clear to auscultation bilaterally.  BREASTS Breasts are examined in the supine and upright position.  The breasts are symmetric.  Both breast show no suspicious erythema, ulceration or nodularity within them. Left breast and axillary scars are well healed. There is no erythema, retraction, desquamation or discharge appreciated within the right or left nipple areolar complex.    LYMPH No supraclavicular, infraclavicular, or axillary lymphadenopathy appreciated bilaterally.  ABDOMEN Soft.    EXT  No clubbing or cyanosis or edema.    NEURO No focal deficits.  MSK  Good ROM.   SKIN  Warm and well perfused.    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 She will not be receiving chemotherapy or continue onto adjuvant hormonal therapy.  I recommend adjuvant XRT to improve local control and overall survival.  Plan is to treat the affected breast based on multiple randomized prospective data which show decrease risk of local recurrence by ~50% with the addition of XRT to BCS and the EBCTCG metaanalysis published in Lancet 2011 which shows that for every 4 recurrences avoided by year 10 one breast cancer death is avoided by year 15.    The risks, benefits, treatment rationale and regimen of radiation therapy to the breast were discussed in great detail today with the patient.  Risks include but are not limited to skin erythema, desquamation, hyper pigmentation, breast and  lymphedema, fibrosis, telengectasia, pneumonitis, cardiac toxicity, rib fracture and secondary malignancy. The patient consented to therapy and is scheduled for a CT simulation. Treatment will start within 1 week.    Additional problem list to be addressed in the following manner:  1. Hormonal Treatment in Postmenopausal Woman: Pt declining chemo and adjuvant hormones.  Given the impact of adjuvant hormonal therapy on her quality of life she will not be having continued surveillance with medical oncology after completing radiation therapy.  She plans to move to [place] for at least this winter starting in November 2020. Pt to continue follow up with PCP as previously directed.  Future mmg/scans per PCP.  There was ample time for questions and all were answered to the patient's satisfaction. Thank you for allowing me to participate in the care of this pleasant patient. If you have any questions, please do not hesitate to contact my office.    Sincerely,  Sid Pino MD            Again, thank you for allowing me to participate in the care of your patient.        Sincerely,        Sid Pino MD

## 2020-10-19 NOTE — NURSING NOTE
INITIAL PATIENT ASSESSMENT      Diagnosis: Breast cancer (left breast)    Patient with history of right breast cancer diagnosed by core biopsy on 7/18/2017 followed by right breast lumpectomy on 8/22/2017.  Patient reports history of radiation therapy to right breast and reports declined adjuvant endocrine therapy in 2017.    Prior radiation therapy:   Site Treated: Right Breast  Facility: [facility] - Dr. Pino  Dates: 9/26/2017 - 10/17/2017  Dose: 4,256 cGy    Prior chemotherapy: None    Prior hormonal therapy: Patient reports history of oral birth control use for approximately 5 to 10 years, denies history of HRT.      Pain Eval:  Denies    Psychosocial  Living arrangements: Lives at home in [place] with  Job.  Patient reports she travels to [place] from end of October through May.  Fall Risk: [facility] Falls Risk Screening Completed: Yes Result: Negative   referral needs: Not needed      Implantable Cardiac Device: No   Authorization To Share Protected Health Information: YES - Date: 10/19/2020    Onset of menarche: age 17  Onset of menopause: s/p hysterectomy per patient report  Abnormal vaginal bleeding/discharge: No  Are you pregnant? No  Reproductive note: patient reports history of two pregnancies with two live births, first at age 21, reports history of breastfeeding for six months in total.  Urine Pregnancy Testing Needed: No     Review of Systems     Constitutional: Negative.    HENT: Negative.    Eyes: Negative.    Respiratory: Negative.    Cardiovascular: Negative.    Gastrointestinal: Negative.    Genitourinary: Negative.    Musculoskeletal: Negative.    Skin: Negative.    Neurological: Negative.    Endo/Heme/Allergies: Negative.    Psychiatric/Behavioral: Negative.      Nurse face-to-face time: Level 5:  over 15 min face to face time.    Ria De Los Santos RN BSN OCN

## 2020-10-20 ENCOUNTER — TELEPHONE (OUTPATIENT)
Dept: RADIATION ONCOLOGY | Facility: CLINIC | Age: 75
End: 2020-10-20

## 2020-10-20 DIAGNOSIS — Z17.0 MALIGNANT NEOPLASM OF UPPER-OUTER QUADRANT OF LEFT BREAST IN FEMALE, ESTROGEN RECEPTOR POSITIVE (H): Primary | ICD-10-CM

## 2020-10-20 DIAGNOSIS — C50.412 MALIGNANT NEOPLASM OF UPPER-OUTER QUADRANT OF LEFT BREAST IN FEMALE, ESTROGEN RECEPTOR POSITIVE (H): Primary | ICD-10-CM

## 2020-10-20 NOTE — TELEPHONE ENCOUNTER
Contacted patient to notify of scheduled daily radiation treatments.  Informed patient of radiation treatment start date of 10/26/2020 at 0830.   Reviewed with patient first day of treatment will be 30 minutes and all remaining daily radiation treatments with be 10 minutes.  Informed patient that weekly visits with Dr. Pino will be on Wednesdays during the course of radiation treatment.    Patient informed of current policy to get a COVID19 test completed within 3 days of starting radiation and that someone from scheduling will be contacted them to schedule this appointment.  Patient verbalized understanding of all information, confirmed appointment dates and times.  **In-Basket sent to Ria HICKS coordinator and Dr. Odette Marques CMA

## 2020-10-21 ENCOUNTER — APPOINTMENT (OUTPATIENT)
Dept: RADIATION ONCOLOGY | Facility: CLINIC | Age: 75
End: 2020-10-21
Payer: MEDICARE

## 2020-10-21 PROCEDURE — 77293 RESPIRATOR MOTION MGMT SIMUL: CPT | Performed by: RADIOLOGY

## 2020-10-21 PROCEDURE — 77295 3-D RADIOTHERAPY PLAN: CPT | Performed by: RADIOLOGY

## 2020-10-21 PROCEDURE — 77334 RADIATION TREATMENT AID(S): CPT | Performed by: RADIOLOGY

## 2020-10-21 PROCEDURE — 77300 RADIATION THERAPY DOSE PLAN: CPT | Performed by: RADIOLOGY

## 2020-10-22 ENCOUNTER — APPOINTMENT (OUTPATIENT)
Dept: RADIATION ONCOLOGY | Facility: CLINIC | Age: 75
End: 2020-10-22
Payer: MEDICARE

## 2020-10-22 PROCEDURE — 77370 RADIATION PHYSICS CONSULT: CPT | Performed by: RADIOLOGY

## 2020-10-23 DIAGNOSIS — C50.412 MALIGNANT NEOPLASM OF UPPER-OUTER QUADRANT OF LEFT BREAST IN FEMALE, ESTROGEN RECEPTOR POSITIVE (H): ICD-10-CM

## 2020-10-23 DIAGNOSIS — Z17.0 MALIGNANT NEOPLASM OF UPPER-OUTER QUADRANT OF LEFT BREAST IN FEMALE, ESTROGEN RECEPTOR POSITIVE (H): ICD-10-CM

## 2020-10-23 PROCEDURE — U0003 INFECTIOUS AGENT DETECTION BY NUCLEIC ACID (DNA OR RNA); SEVERE ACUTE RESPIRATORY SYNDROME CORONAVIRUS 2 (SARS-COV-2) (CORONAVIRUS DISEASE [COVID-19]), AMPLIFIED PROBE TECHNIQUE, MAKING USE OF HIGH THROUGHPUT TECHNOLOGIES AS DESCRIBED BY CMS-2020-01-R: HCPCS | Performed by: RADIOLOGY

## 2020-10-24 LAB
SARS-COV-2 RNA SPEC QL NAA+PROBE: NOT DETECTED
SPECIMEN SOURCE: NORMAL

## 2020-10-26 ENCOUNTER — APPOINTMENT (OUTPATIENT)
Dept: RADIATION ONCOLOGY | Facility: CLINIC | Age: 75
End: 2020-10-26
Payer: MEDICARE

## 2020-10-26 PROCEDURE — 77280 THER RAD SIMULAJ FIELD SMPL: CPT | Performed by: RADIOLOGY

## 2020-10-27 ENCOUNTER — APPOINTMENT (OUTPATIENT)
Dept: RADIATION ONCOLOGY | Facility: CLINIC | Age: 75
End: 2020-10-27
Payer: MEDICARE

## 2020-10-27 PROCEDURE — 77412 RADIATION TX DELIVERY LVL 3: CPT | Performed by: RADIOLOGY

## 2020-10-27 PROCEDURE — 77387 GUIDANCE FOR RADJ TX DLVR: CPT | Performed by: RADIOLOGY

## 2020-10-28 ENCOUNTER — APPOINTMENT (OUTPATIENT)
Dept: RADIATION ONCOLOGY | Facility: CLINIC | Age: 75
End: 2020-10-28
Payer: MEDICARE

## 2020-10-28 ENCOUNTER — OFFICE VISIT (OUTPATIENT)
Dept: RADIATION ONCOLOGY | Facility: CLINIC | Age: 75
End: 2020-10-28
Payer: MEDICARE

## 2020-10-28 VITALS
WEIGHT: 141.4 LBS | RESPIRATION RATE: 16 BRPM | DIASTOLIC BLOOD PRESSURE: 77 MMHG | BODY MASS INDEX: 26.72 KG/M2 | SYSTOLIC BLOOD PRESSURE: 141 MMHG | OXYGEN SATURATION: 98 % | HEART RATE: 57 BPM

## 2020-10-28 DIAGNOSIS — Z17.0 MALIGNANT NEOPLASM OF UPPER-OUTER QUADRANT OF LEFT BREAST IN FEMALE, ESTROGEN RECEPTOR POSITIVE (H): Primary | ICD-10-CM

## 2020-10-28 DIAGNOSIS — C50.412 MALIGNANT NEOPLASM OF UPPER-OUTER QUADRANT OF LEFT BREAST IN FEMALE, ESTROGEN RECEPTOR POSITIVE (H): Primary | ICD-10-CM

## 2020-10-28 PROCEDURE — 77412 RADIATION TX DELIVERY LVL 3: CPT | Performed by: RADIOLOGY

## 2020-10-28 PROCEDURE — 77387 GUIDANCE FOR RADJ TX DLVR: CPT | Performed by: RADIOLOGY

## 2020-10-28 PROCEDURE — 99207 PR DROP WITH A PROCEDURE: CPT | Performed by: RADIOLOGY

## 2020-10-28 ASSESSMENT — PAIN SCALES - GENERAL: PAINLEVEL: NO PAIN (0)

## 2020-10-28 NOTE — PROGRESS NOTES
[facility]  SPECIALIZING IN BREAKTHROUGHS  Radiation Oncology    On Treatment Visit Note      Shantelle Hunter      Date: 10/28/2020   MRN: [id number]   : 1945  Diagnosis: L breast ER+      Reason for Visit:  On Radiation Treatment Visit     Treatment Summary to Date  Treatment Site: L breast Current Dose: 532/4256 cGy Fractions:       Chemotherapy  Chemo concurrent with radx?: No(Dr. Neville)    Subjective:     Early in treatment doing well with no complaints.    Nursing ROS:   Nutrition Alteration  Diet Type: Patient's Preference  Skin  Skin Reaction: 0 - No changes  Skin Note: Aquaphor BID- coupon and samples provided, vanicream as alternative        Cardiovascular  Respiratory effort: 1 - Normal - without distress           Pain Assessment  0-10 Pain Scale: 0      Objective:   BP (!) 141/77 (BP Location: Left arm, Patient Position: Sitting, Cuff Size: Adult Regular)   Pulse 57   Resp 16   Wt 64.1 kg (141 lb 6.4 oz)   SpO2 98%   BMI 26.72 kg/m    Gen: Appears well, in no acute distress  Skin: No erythema    Labs:  CBC RESULTS: No results for input(s): WBC, RBC, HGB, HCT, MCV, MCH, MCHC, RDW, PLT in the last 50017 hours.  ELECTROLYTES:  No results for input(s): NA, POTASSIUM, CHLORIDE, JEFFREY, CO2, BUN, CR, GLC in the last 91005 hours.    Assessment:    Tolerating radiation therapy well.  All questions and concerns addressed.    Plan:   1. Continue current therapy.    2. Skin care per above      Mosaiq chart and setup information reviewed  Ports checked    Medication Review  Med list reviewed with patient?: Yes    Educational Topic Discussed  Education Instructions: Skin cares- aquaphor BID, samples and coupon given, vanicream as alernative, fatigue management        Sid Pino MD    Please do not send letter to referring physician.

## 2020-10-28 NOTE — LETTER
10/28/2020         RE: Shantelle Hunter  [address]  [facility] [address]        Dear Colleague,    Thank you for referring your patient, Shantelle Hunter, to the [facility] RADIATION ONCOLOGY [place]. Please see a copy of my visit note below.    [facility] PHYSICIANS  SPECIALIZING IN BREAKTHROUGHS  Radiation Oncology    On Treatment Visit Note      Shantelle Hunter      Date: 10/28/2020   MRN: [id number]   : 1945  Diagnosis: L breast ER+      Reason for Visit:  On Radiation Treatment Visit     Treatment Summary to Date  Treatment Site: L breast Current Dose: 532/4256 cGy Fractions:       Chemotherapy  Chemo concurrent with radx?: No(Dr. Neville)    Subjective:     Early in treatment doing well with no complaints.    Nursing ROS:   Nutrition Alteration  Diet Type: Patient's Preference  Skin  Skin Reaction: 0 - No changes  Skin Note: Aquaphor BID- coupon and samples provided, vanicream as alternative        Cardiovascular  Respiratory effort: 1 - Normal - without distress           Pain Assessment  0-10 Pain Scale: 0      Objective:   BP (!) 141/77 (BP Location: Left arm, Patient Position: Sitting, Cuff Size: Adult Regular)   Pulse 57   Resp 16   Wt 64.1 kg (141 lb 6.4 oz)   SpO2 98%   BMI 26.72 kg/m    Gen: Appears well, in no acute distress  Skin: No erythema    Labs:  CBC RESULTS: No results for input(s): WBC, RBC, HGB, HCT, MCV, MCH, MCHC, RDW, PLT in the last 90749 hours.  ELECTROLYTES:  No results for input(s): NA, POTASSIUM, CHLORIDE, JEFFREY, CO2, BUN, CR, GLC in the last 96092 hours.    Assessment:    Tolerating radiation therapy well.  All questions and concerns addressed.    Plan:   1. Continue current therapy.    2. Skin care per above      Mosaiq chart and setup information reviewed  Ports checked    Medication Review  Med list reviewed with patient?: Yes    Educational Topic Discussed  Education Instructions: Skin cares- aquaphor BID, samples and coupon given,  vanicream as alernative, fatigue management        Sid Pino MD    Please do not send letter to referring physician.          Again, thank you for allowing me to participate in the care of your patient.        Sincerely,        Sid Pino MD

## 2020-10-28 NOTE — PATIENT INSTRUCTIONS
Please contact [facility] RN with questions or concerns following today's appointment: [phone].    Thank you!

## 2020-10-29 ENCOUNTER — APPOINTMENT (OUTPATIENT)
Dept: RADIATION ONCOLOGY | Facility: CLINIC | Age: 75
End: 2020-10-29
Payer: MEDICARE

## 2020-10-29 PROCEDURE — 77412 RADIATION TX DELIVERY LVL 3: CPT | Performed by: RADIOLOGY

## 2020-10-29 PROCEDURE — 77387 GUIDANCE FOR RADJ TX DLVR: CPT | Performed by: RADIOLOGY

## 2020-10-30 ENCOUNTER — APPOINTMENT (OUTPATIENT)
Dept: RADIATION ONCOLOGY | Facility: CLINIC | Age: 75
End: 2020-10-30
Payer: MEDICARE

## 2020-10-30 PROCEDURE — 77412 RADIATION TX DELIVERY LVL 3: CPT | Performed by: RADIOLOGY

## 2020-10-30 PROCEDURE — 77387 GUIDANCE FOR RADJ TX DLVR: CPT | Performed by: RADIOLOGY

## 2020-11-02 ENCOUNTER — APPOINTMENT (OUTPATIENT)
Dept: RADIATION ONCOLOGY | Facility: CLINIC | Age: 75
End: 2020-11-02
Payer: MEDICARE

## 2020-11-02 PROCEDURE — 77336 RADIATION PHYSICS CONSULT: CPT | Performed by: RADIOLOGY

## 2020-11-02 PROCEDURE — 77387 GUIDANCE FOR RADJ TX DLVR: CPT | Performed by: RADIOLOGY

## 2020-11-02 PROCEDURE — 77412 RADIATION TX DELIVERY LVL 3: CPT | Performed by: RADIOLOGY

## 2020-11-02 PROCEDURE — 77417 THER RADIOLOGY PORT IMAGE(S): CPT | Performed by: RADIOLOGY

## 2020-11-02 PROCEDURE — 77427 RADIATION TX MANAGEMENT X5: CPT | Performed by: RADIOLOGY

## 2020-11-02 NOTE — PATIENT INSTRUCTIONS
Please contact [facility] RN with questions or concerns following today's appointment: [phone].    Thank you!     not applicable

## 2020-11-03 ENCOUNTER — APPOINTMENT (OUTPATIENT)
Dept: RADIATION ONCOLOGY | Facility: CLINIC | Age: 75
End: 2020-11-03
Payer: MEDICARE

## 2020-11-03 PROCEDURE — 77387 GUIDANCE FOR RADJ TX DLVR: CPT | Performed by: RADIOLOGY

## 2020-11-03 PROCEDURE — 77412 RADIATION TX DELIVERY LVL 3: CPT | Performed by: RADIOLOGY

## 2020-11-04 ENCOUNTER — OFFICE VISIT (OUTPATIENT)
Dept: RADIATION ONCOLOGY | Facility: CLINIC | Age: 75
End: 2020-11-04
Payer: MEDICARE

## 2020-11-04 ENCOUNTER — APPOINTMENT (OUTPATIENT)
Dept: RADIATION ONCOLOGY | Facility: CLINIC | Age: 75
End: 2020-11-04
Payer: MEDICARE

## 2020-11-04 VITALS
WEIGHT: 141.8 LBS | RESPIRATION RATE: 16 BRPM | DIASTOLIC BLOOD PRESSURE: 86 MMHG | TEMPERATURE: 98 F | SYSTOLIC BLOOD PRESSURE: 155 MMHG | BODY MASS INDEX: 26.79 KG/M2 | OXYGEN SATURATION: 98 % | HEART RATE: 61 BPM

## 2020-11-04 DIAGNOSIS — C50.412 MALIGNANT NEOPLASM OF UPPER-OUTER QUADRANT OF LEFT BREAST IN FEMALE, ESTROGEN RECEPTOR POSITIVE (H): Primary | ICD-10-CM

## 2020-11-04 DIAGNOSIS — Z17.0 MALIGNANT NEOPLASM OF UPPER-OUTER QUADRANT OF LEFT BREAST IN FEMALE, ESTROGEN RECEPTOR POSITIVE (H): Primary | ICD-10-CM

## 2020-11-04 PROCEDURE — 77387 GUIDANCE FOR RADJ TX DLVR: CPT | Performed by: RADIOLOGY

## 2020-11-04 PROCEDURE — 99207 PR DROP WITH A PROCEDURE: CPT | Performed by: RADIOLOGY

## 2020-11-04 PROCEDURE — 77412 RADIATION TX DELIVERY LVL 3: CPT | Performed by: RADIOLOGY

## 2020-11-04 ASSESSMENT — PAIN SCALES - GENERAL: PAINLEVEL: NO PAIN (0)

## 2020-11-04 NOTE — PATIENT INSTRUCTIONS
Please contact [facility] RN with questions or concerns following today's appointment: [phone].    Thank you!

## 2020-11-04 NOTE — PROGRESS NOTES
[facility] PHYSICIANS  SPECIALIZING IN BREAKTHROUGHS  Radiation Oncology    On Treatment Visit Note      Shantelle Hunter      Date: 2020   MRN: [id number]   : 1945  Diagnosis: L breast ER+      Reason for Visit:  On Radiation Treatment Visit     Treatment Summary to Date  Treatment Site: L breast Current Dose: 1862/4256 cGy Fractions:       Chemotherapy  Chemo concurrent with radx?: No(Dr. Neville)    Subjective:     Doing well with no complaints.    Nursing ROS:   Nutrition Alteration  Diet Type: Patient's Preference  Skin  Skin Reaction: 0 - No changes  Skin Note: patient reports using Aquaphor daily        Cardiovascular  Respiratory effort: 1 - Normal - without distress        Psychosocial  Mood - Anxiety: 1 - Mild mood alteration  Pyschosocial Note: patient states she is stressed this morning due to needing to get her  to a doctor appointment in time, patient seen for OTV prior to treatment today to assist her in getting to her husbands appointment on time.  Pain Assessment  0-10 Pain Scale: 0      Objective:   BP (!) 155/86 (BP Location: Right arm, Patient Position: Sitting, Cuff Size: Adult Regular)   Pulse 61   Temp 98  F (36.7  C) (Oral)   Resp 16   Wt 64.3 kg (141 lb 12.8 oz)   SpO2 98%   BMI 26.79 kg/m    Gen: Appears well, in no acute distress  Skin: No erythema    Labs:  CBC RESULTS: No results for input(s): WBC, RBC, HGB, HCT, MCV, MCH, MCHC, RDW, PLT in the last 70788 hours.  ELECTROLYTES:  No results for input(s): NA, POTASSIUM, CHLORIDE, JEFFREY, CO2, BUN, CR, GLC in the last 39532 hours.    Assessment:    Tolerating radiation therapy well.  All questions and concerns addressed.    Plan:   1. Continue current therapy.    2. Skin care per above.      Mosaiq chart and setup information reviewed  Ports checked    Medication Review  Med list reviewed with patient?: Yes    Educational Topic Discussed  Education Instructions: skin cares, fatigue        Sid Pino  MD    Please do not send letter to referring physician.

## 2020-11-04 NOTE — LETTER
2020         RE: Shantelle Hunter  [address]  [facility] [address]        Dear Colleague,    Thank you for referring your patient, Shantelle Hunter, to the [facility] RADIATION ONCOLOG[facility] [place]. Please see a copy of my visit note below.    [facility] PHYSICIANS  SPECIALIZING IN BREAKTHROUGHS  Radiation Oncology    On Treatment Visit Note      Shantelle Hunter      Date: 2020   MRN: [id number]   : 1945  Diagnosis: L breast ER+      Reason for Visit:  On Radiation Treatment Visit     Treatment Summary to Date  Treatment Site: L breast Current Dose: 1862/4256 cGy Fractions:       Chemotherapy  Chemo concurrent with radx?: No(Dr. Neville)    Subjective:     Doing well with no complaints.    Nursing ROS:   Nutrition Alteration  Diet Type: Patient's Preference  Skin  Skin Reaction: 0 - No changes  Skin Note: patient reports using Aquaphor daily        Cardiovascular  Respiratory effort: 1 - Normal - without distress        Psychosocial  Mood - Anxiety: 1 - Mild mood alteration  Pyschosocial Note: patient states she is stressed this morning due to needing to get her  to a doctor appointment in time, patient seen for OTV prior to treatment today to assist her in getting to her husbands appointment on time.  Pain Assessment  0-10 Pain Scale: 0      Objective:   BP (!) 155/86 (BP Location: Right arm, Patient Position: Sitting, Cuff Size: Adult Regular)   Pulse 61   Temp 98  F (36.7  C) (Oral)   Resp 16   Wt 64.3 kg (141 lb 12.8 oz)   SpO2 98%   BMI 26.79 kg/m    Gen: Appears well, in no acute distress  Skin: No erythema    Labs:  CBC RESULTS: No results for input(s): WBC, RBC, HGB, HCT, MCV, MCH, MCHC, RDW, PLT in the last 52513 hours.  ELECTROLYTES:  No results for input(s): NA, POTASSIUM, CHLORIDE, JEFFREY, CO2, BUN, CR, GLC in the last 22928 hours.    Assessment:    Tolerating radiation therapy well.  All questions and concerns addressed.    Plan:   1. Continue  current therapy.    2. Skin care per above.      Mosaiq chart and setup information reviewed  Ports checked    Medication Review  Med list reviewed with patient?: Yes    Educational Topic Discussed  Education Instructions: skin cares, fatigue        Sid Pino MD    Please do not send letter to referring physician.          Again, thank you for allowing me to participate in the care of your patient.        Sincerely,        Sid Pino MD

## 2020-11-05 ENCOUNTER — APPOINTMENT (OUTPATIENT)
Dept: RADIATION ONCOLOGY | Facility: CLINIC | Age: 75
End: 2020-11-05
Payer: MEDICARE

## 2020-11-05 PROCEDURE — 77387 GUIDANCE FOR RADJ TX DLVR: CPT | Performed by: RADIOLOGY

## 2020-11-05 PROCEDURE — 77412 RADIATION TX DELIVERY LVL 3: CPT | Performed by: RADIOLOGY

## 2020-11-06 ENCOUNTER — APPOINTMENT (OUTPATIENT)
Dept: RADIATION ONCOLOGY | Facility: CLINIC | Age: 75
End: 2020-11-06
Payer: MEDICARE

## 2020-11-06 PROCEDURE — 77412 RADIATION TX DELIVERY LVL 3: CPT | Performed by: RADIOLOGY

## 2020-11-09 ENCOUNTER — APPOINTMENT (OUTPATIENT)
Dept: RADIATION ONCOLOGY | Facility: CLINIC | Age: 75
End: 2020-11-09
Payer: MEDICARE

## 2020-11-09 PROCEDURE — 77387 GUIDANCE FOR RADJ TX DLVR: CPT | Performed by: RADIOLOGY

## 2020-11-09 PROCEDURE — 77412 RADIATION TX DELIVERY LVL 3: CPT | Performed by: RADIOLOGY

## 2020-11-09 PROCEDURE — 77427 RADIATION TX MANAGEMENT X5: CPT | Performed by: RADIOLOGY

## 2020-11-09 PROCEDURE — 77417 THER RADIOLOGY PORT IMAGE(S): CPT | Performed by: RADIOLOGY

## 2020-11-09 PROCEDURE — 77336 RADIATION PHYSICS CONSULT: CPT | Performed by: RADIOLOGY

## 2020-11-10 ENCOUNTER — APPOINTMENT (OUTPATIENT)
Dept: RADIATION ONCOLOGY | Facility: CLINIC | Age: 75
End: 2020-11-10
Payer: MEDICARE

## 2020-11-10 PROCEDURE — 77387 GUIDANCE FOR RADJ TX DLVR: CPT | Performed by: RADIOLOGY

## 2020-11-10 PROCEDURE — 77412 RADIATION TX DELIVERY LVL 3: CPT | Performed by: RADIOLOGY

## 2020-11-11 ENCOUNTER — OFFICE VISIT (OUTPATIENT)
Dept: RADIATION ONCOLOGY | Facility: CLINIC | Age: 75
End: 2020-11-11
Payer: MEDICARE

## 2020-11-11 ENCOUNTER — APPOINTMENT (OUTPATIENT)
Dept: RADIATION ONCOLOGY | Facility: CLINIC | Age: 75
End: 2020-11-11
Payer: MEDICARE

## 2020-11-11 VITALS
HEART RATE: 63 BPM | DIASTOLIC BLOOD PRESSURE: 86 MMHG | TEMPERATURE: 98 F | SYSTOLIC BLOOD PRESSURE: 148 MMHG | WEIGHT: 141.9 LBS | RESPIRATION RATE: 16 BRPM | BODY MASS INDEX: 26.81 KG/M2 | OXYGEN SATURATION: 99 %

## 2020-11-11 DIAGNOSIS — Z17.0 MALIGNANT NEOPLASM OF UPPER-OUTER QUADRANT OF LEFT BREAST IN FEMALE, ESTROGEN RECEPTOR POSITIVE (H): Primary | ICD-10-CM

## 2020-11-11 DIAGNOSIS — C50.412 MALIGNANT NEOPLASM OF UPPER-OUTER QUADRANT OF LEFT BREAST IN FEMALE, ESTROGEN RECEPTOR POSITIVE (H): Primary | ICD-10-CM

## 2020-11-11 PROCEDURE — 77387 GUIDANCE FOR RADJ TX DLVR: CPT | Performed by: RADIOLOGY

## 2020-11-11 PROCEDURE — 77412 RADIATION TX DELIVERY LVL 3: CPT | Performed by: RADIOLOGY

## 2020-11-11 PROCEDURE — 99207 PR DROP WITH A PROCEDURE: CPT | Performed by: RADIOLOGY

## 2020-11-11 ASSESSMENT — PAIN SCALES - GENERAL: PAINLEVEL: NO PAIN (0)

## 2020-11-11 NOTE — PROGRESS NOTES
[facility] PHYSICIANS  SPECIALIZING IN BREAKTHROUGHS  Radiation Oncology    On Treatment Visit Note      Shantelle Hunter      Date: 2020   MRN: [id number]   : 1945  Diagnosis: L breast ER+      Reason for Visit:  On Radiation Treatment Visit     Treatment Summary to Date  Treatment Site: L breast Current Dose: 3192/4256 cGy Fractions:       Chemotherapy  Chemo concurrent with radx?: No(Dr. Neville)    Subjective:     No skin changes to report.    Nursing ROS:   Nutrition Alteration  Diet Type: Patient's Preference  Skin  Skin Reaction: 0 - No changes  Skin Note: patient reports using Aquaphor daily        Cardiovascular  Respiratory effort: 1 - Normal - without distress        Psychosocial  Mood - Anxiety: 0 - Normal  Pain Assessment  0-10 Pain Scale: 0      Objective:   BP (!) 148/86 (BP Location: Left arm, Patient Position: Sitting, Cuff Size: Adult Regular)   Pulse 63   Temp 98  F (36.7  C) (Oral)   Resp 16   Wt 64.4 kg (141 lb 14.4 oz)   SpO2 99%   BMI 26.81 kg/m    Gen: Appears well, in no acute distress  Skin: Minimal diffuse erythema over treatment field    Labs:  CBC RESULTS: No results for input(s): WBC, RBC, HGB, HCT, MCV, MCH, MCHC, RDW, PLT in the last 87382 hours.  ELECTROLYTES:  No results for input(s): NA, POTASSIUM, CHLORIDE, JEFFREY, CO2, BUN, CR, GLC in the last 18286 hours.    Assessment:    Tolerating radiation therapy well.  All questions and concerns addressed.    Toxicities:  Dermatitis: Grade 1: Faint erythema or dry desquamation    Plan:   1. Continue current therapy.    2. Skin care per above.      Mosaiq chart and setup information reviewed  Ports checked    Medication Review  Med list reviewed with patient?: Yes    Educational Topic Discussed  Additional Instructions: 1 month telephone follow up with RN, in person follow up with Dr. Pino in 6 months when back from [place]   Education Instructions: skin cares, fatigue        Sid Pino MD    Please do not send  letter to referring physician.

## 2020-11-11 NOTE — PATIENT INSTRUCTIONS
Please contact [facility] RN with questions or concerns following today's appointment: [phone].    Thank you!

## 2020-11-11 NOTE — LETTER
2020         RE: Shantelle Hunter  [address]  [place] [facility] [address]        Dear Colleague,    Thank you for referring your patient, Shantelle Hunter, to the [facility] RADIATION [facility]COL[facility] [place]. Please see a copy of my visit note below.    [facility] PHYSICIANS  SPECIALIZING IN BREAKTHROUGHS  Radiation Oncology    On Treatment Visit Note      Shantelle Hunter      Date: 2020   MRN: [id number]   : 1945  Diagnosis: L breast ER+      Reason for Visit:  On Radiation Treatment Visit     Treatment Summary to Date  Treatment Site: L breast Current Dose: 3192/4256 cGy Fractions:       Chemotherapy  Chemo concurrent with radx?: No(Dr. Neville)    Subjective:     No skin changes to report.    Nursing ROS:   Nutrition Alteration  Diet Type: Patient's Preference  Skin  Skin Reaction: 0 - No changes  Skin Note: patient reports using Aquaphor daily        Cardiovascular  Respiratory effort: 1 - Normal - without distress        Psychosocial  Mood - Anxiety: 0 - Normal  Pain Assessment  0-10 Pain Scale: 0      Objective:   BP (!) 148/86 (BP Location: Left arm, Patient Position: Sitting, Cuff Size: Adult Regular)   Pulse 63   Temp 98  F (36.7  C) (Oral)   Resp 16   Wt 64.4 kg (141 lb 14.4 oz)   SpO2 99%   BMI 26.81 kg/m    Gen: Appears well, in no acute distress  Skin: Minimal diffuse erythema over treatment field    Labs:  CBC RESULTS: No results for input(s): WBC, RBC, HGB, HCT, MCV, MCH, MCHC, RDW, PLT in the last 42999 hours.  ELECTROLYTES:  No results for input(s): NA, POTASSIUM, CHLORIDE, JEFFREY, CO2, BUN, CR, GLC in the last 08119 hours.    Assessment:    Tolerating radiation therapy well.  All questions and concerns addressed.    Toxicities:  Dermatitis: Grade 1: Faint erythema or dry desquamation    Plan:   1. Continue current therapy.    2. Skin care per above.      Yozonsiq chart and setup information reviewed  Ports checked    Medication Review  Med list reviewed  with patient?: Yes    Educational Topic Discussed  Additional Instructions: 1 month telephone follow up with RN, in person follow up with Dr. Pino in 6 months when back from [place]   Education Instructions: skin cares, fatigue        Sid Pino MD    Please do not send letter to referring physician.          Again, thank you for allowing me to participate in the care of your patient.        Sincerely,        Sid Pino MD

## 2020-11-12 ENCOUNTER — APPOINTMENT (OUTPATIENT)
Dept: RADIATION ONCOLOGY | Facility: CLINIC | Age: 75
End: 2020-11-12
Payer: MEDICARE

## 2020-11-12 PROCEDURE — 77412 RADIATION TX DELIVERY LVL 3: CPT | Performed by: RADIOLOGY

## 2020-11-12 PROCEDURE — 77387 GUIDANCE FOR RADJ TX DLVR: CPT | Performed by: RADIOLOGY

## 2020-11-13 ENCOUNTER — APPOINTMENT (OUTPATIENT)
Dept: RADIATION ONCOLOGY | Facility: CLINIC | Age: 75
End: 2020-11-13
Payer: MEDICARE

## 2020-11-13 PROCEDURE — 77412 RADIATION TX DELIVERY LVL 3: CPT | Performed by: RADIOLOGY

## 2020-11-13 PROCEDURE — 77387 GUIDANCE FOR RADJ TX DLVR: CPT | Performed by: RADIOLOGY

## 2020-11-16 ENCOUNTER — APPOINTMENT (OUTPATIENT)
Dept: RADIATION ONCOLOGY | Facility: CLINIC | Age: 75
End: 2020-11-16
Payer: MEDICARE

## 2020-11-16 PROCEDURE — 77412 RADIATION TX DELIVERY LVL 3: CPT | Performed by: RADIOLOGY

## 2020-11-16 PROCEDURE — 77336 RADIATION PHYSICS CONSULT: CPT | Performed by: RADIOLOGY

## 2020-11-16 PROCEDURE — 77427 RADIATION TX MANAGEMENT X5: CPT | Performed by: RADIOLOGY

## 2020-11-17 ENCOUNTER — APPOINTMENT (OUTPATIENT)
Dept: RADIATION ONCOLOGY | Facility: CLINIC | Age: 75
End: 2020-11-17
Payer: MEDICARE

## 2020-11-17 PROCEDURE — 77412 RADIATION TX DELIVERY LVL 3: CPT | Performed by: RADIOLOGY

## 2020-11-19 ENCOUNTER — ONCOLOGY VISIT (OUTPATIENT)
Dept: RADIATION ONCOLOGY | Facility: CLINIC | Age: 75
End: 2020-11-19

## 2020-11-24 NOTE — PROCEDURES
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 She will not be   receiving chemotherapy or continue onto adjuvant hormonal therapy.                         Details of the treatments summarized below are found in records kept in the Department of Radiation Oncology at [facility].     Treatment Summary:  Radiation Oncology - Course: 2   Treatment Site    Dose                Modality From To Elapsed Days Fx.  1 Lt Breast       4,256 cGy   10 X 10/27/2020 2020  21 16                Dose per Fraction:  266 cGy      Total Dose: 4,256 cGy             COMMENTS:                      Patient experienced grade 1 skin reaction managed well with moisturizer.  Patient experienced mild fatigue   related to radiation therapy.     PAIN MANAGEMENT:      Patient did not require any pain medications.                          FOLLOW UP PLAN:  Patient will follow up in Radiation Oncology in 1 month with RN or sooner if any concerns.  Patient will follow   up with Dr. Pino in 6 months.  Patient will follow up with medical oncology.                            Staff Physician: Sid Pino M.D.  Physicist: Pattie Sawyer MS     CC:               Radiation Oncology [address] [place] [id number] Phone: [phone]

## 2020-12-14 ENCOUNTER — ALLIED HEALTH/NURSE VISIT (OUTPATIENT)
Dept: RADIATION ONCOLOGY | Facility: CLINIC | Age: 75
End: 2020-12-14
Payer: MEDICARE

## 2020-12-14 DIAGNOSIS — C50.412 MALIGNANT NEOPLASM OF UPPER-OUTER QUADRANT OF LEFT BREAST IN FEMALE, ESTROGEN RECEPTOR POSITIVE (H): Primary | ICD-10-CM

## 2020-12-14 DIAGNOSIS — Z17.0 MALIGNANT NEOPLASM OF UPPER-OUTER QUADRANT OF LEFT BREAST IN FEMALE, ESTROGEN RECEPTOR POSITIVE (H): Primary | ICD-10-CM

## 2020-12-14 NOTE — PATIENT INSTRUCTIONS
Preventive Care:    Colorectal Cancer Screening: During our visit today, we discussed that it is recommended you receive colorectal cancer screening. Please call or make an appointment with your primary care provider to discuss this. You may also call the [facility] scheduling line ([phone]) to set up a colonoscopy appointment.    Please contact [facility] RN with questions or concerns following today's appointment: [phone].    Thank you!

## 2020-12-14 NOTE — PROGRESS NOTES
Radiation Oncology Nurse Only Telephone Visit - One Month Follow-Up      History of Radiation Treatment:    Site: L breast  Total Dose: 4,256 cGy  Date Completed: 11/17/2020  Clinic: [facility]  Physician: Dr. Pino    Pain:  Denies    Range of Motion:   No Concerns    Fatigue:   Grade 0: No toxicity    Skin:  No Concerns  Lotions/Creams: no    Medical Oncologist: Dr. Neville    Endocrine Therapy: Patient reports she declined adjuvant hormonal therapy.     Follow-up with Radiation Oncologist: Patient declined future follow up with Dr. Pino, states she is following with her PCP Dr. Ash for future imaging.       Future Appointments:      MD Name: Dr. Neville Appointment Date: Declined further follow up   MD Name:  Appointment Date:    MD Name: Appointment Date:        Patient verbalized understanding of all information and was encouraged to contact this RN with questions or concerns following today's visit at [phone].    Previous Radiation Treatment added to Medical History: Yes    Breana Kowalski RN

## 2021-01-15 ENCOUNTER — DOCUMENTATION ONLY (OUTPATIENT)
Dept: RADIATION ONCOLOGY | Facility: CLINIC | Age: 76
End: 2021-01-15

## 2021-01-15 NOTE — PROGRESS NOTES
When calling patient to schedule follow-up patient stated her insurance isn't covering the phone visits and she is in [place].  Patient does not want to make future follow up appointments and will follow-up with her PCP Dr. Tee upon returning to [place].